# Patient Record
Sex: FEMALE | Race: BLACK OR AFRICAN AMERICAN | Employment: OTHER | URBAN - METROPOLITAN AREA
[De-identification: names, ages, dates, MRNs, and addresses within clinical notes are randomized per-mention and may not be internally consistent; named-entity substitution may affect disease eponyms.]

---

## 2017-01-04 ENCOUNTER — OFFICE VISIT (OUTPATIENT)
Dept: FAMILY MEDICINE CLINIC | Age: 82
End: 2017-01-04

## 2017-01-04 VITALS
SYSTOLIC BLOOD PRESSURE: 148 MMHG | HEART RATE: 57 BPM | HEIGHT: 64 IN | WEIGHT: 177 LBS | TEMPERATURE: 97 F | OXYGEN SATURATION: 97 % | DIASTOLIC BLOOD PRESSURE: 70 MMHG | BODY MASS INDEX: 30.22 KG/M2 | RESPIRATION RATE: 18 BRPM

## 2017-01-04 DIAGNOSIS — I10 ESSENTIAL HYPERTENSION: ICD-10-CM

## 2017-01-04 DIAGNOSIS — S90.411A ABRASION, RIGHT GREAT TOE, INITIAL ENCOUNTER: Primary | ICD-10-CM

## 2017-01-04 NOTE — PROGRESS NOTES
Here for follow up on her foot wound. She went to .  Foot is feeling better-she is not able to inspect the area  PF and [facility] is the only provider she has seen since last visit  Non smoker and no plans to start  We are out of Right to Decide booklets  She got a tetanus booster in [place]-will xena the records--[facility]  She is due for [address]

## 2017-01-04 NOTE — PROGRESS NOTES
Nurse history read and confirmed by patient. Visit Vitals    /70    Pulse (!) 57    Temp 97 °F (36.1 °C)    Resp 18    Ht 5' 4\" (1.626 m)    Wt 177 lb (80.3 kg)    SpO2 97%    BMI 30.38 kg/m2       Patient alert and cooperative. Right great toe - no swelling, warmth or erythema. Steri-Strips in place on the plantar aspect. No drainage. Assessment:  1. Right great toe plantar aspect abrasion. Steri-Strips in place. No evidence of secondary infection. Plan:  1. Can wear regular shoe at this point. No barefoot walking. 2. Watch for secondary infection. 3. Can shower with soap and water, dry thoroughly. 4. Advised to let the Steri-Strips come off on their own. 5. Return in three months for Medicare wellness. Will be due for annual visit and labs in June/July. 6. Follow otherwise here prn.

## 2017-01-04 NOTE — MR AVS SNAPSHOT
Visit Information Date & Time Provider Department Dept. Phone Encounter #  
 1/4/2017 11:00 AM Theora Alpers, MD [facility] [phone] [id number] Follow-up Instructions Return in about 3 months (around 4/4/2017) for Leeroy MWV with Kennedi Hayes. Routing History Your Appointments 1/11/2017  9:40 AM  
ESTABLISHED PATIENT with Yair Arrieta MD  
[facility] (ISMAEL SCHEDULING) Appt Note: 1 year follow up  
 [address] [facility] [address]  
[address] [facility] [address] Upcoming Health Maintenance Date Due  
 MEDICARE YEARLY EXAM 6/7/2015 BREAST CANCER SCRN MAMMOGRAM 1/29/2017 GLAUCOMA SCREENING Q2Y 4/4/2017* DTaP/Tdap/Td series (1 - Tdap) 4/4/2017* INFLUENZA AGE 9 TO ADULT 12/30/2017* Pneumococcal 65+ Low/Medium Risk (2 of 2 - PPSV23) 6/22/2017 *Topic was postponed. The date shown is not the original due date. Allergies as of 1/4/2017  Review Complete On: 1/4/2017 By: Andrea Flores LPN Severity Noted Reaction Type Reactions Caffeine  03/31/2010    Other (comments)  
 insomnia Lisinopril-hydrochlorothiazide  03/27/2013    Rash Current Immunizations  Reviewed on 6/22/2016 No immunizations on file. Not reviewed this visit You Were Diagnosed With   
  
 Codes Comments Abrasion, right great toe, initial encounter    -  Primary ICD-10-CM: N80.671Y ICD-9-CM: 917.0 Essential hypertension     ICD-10-CM: I10 
ICD-9-CM: 401.9 Elevated BP     ICD-10-CM: I10 
ICD-9-CM: 401.9 Vitals BP Pulse Temp Resp Height(growth percentile) Weight(growth percentile) 148/70 (!) 57 97 °F (36.1 °C) 18 5' 4\" (1.626 m) 177 lb (80.3 kg) SpO2 BMI OB Status Smoking Status 97% 30.38 kg/m2 Postmenopausal Never Smoker BMI and BSA Data  Body Mass Index Body Surface Area  
 30.38 kg/m 2 1.9 m 2  
  
  
 Preferred Pharmacy Pharmacy Name Phone [facility] [address] [phone] Your Updated Medication List  
  
   
This list is accurate as of: 1/4/17 11:38 AM.  Always use your most recent med list. ALLEGRA 180 mg tablet Generic drug:  fexofenadine Take  by mouth daily. amoxicillin 875 mg tablet Commonly known as:  AMOXIL Take 1 Tab by mouth two (2) times a day for 10 days. aspirin 325 mg tablet Commonly known as:  ASPIRIN Take 325 mg by mouth daily. beclomethasone 80 mcg/actuation inhaler Commonly known as:  QVAR Take 1 Puff by inhalation two (2) times a day. cholecalciferol (VITAMIN D3) 5,000 unit Tab tablet Commonly known as:  VITAMIN D3 Take  by mouth daily. losartan 50 mg tablet Commonly known as:  COZAAR Take 1 Tab by mouth daily. ofloxacin 0.3 % otic solution Commonly known as:  FLOXIN Administer 5 Drops in left ear daily. peg 400-propylene glycol 0.4-0.3 % Drop Commonly known as:  SYSTANE Administer 1 Drop to both eyes as needed. Follow-up Instructions Return in about 3 months (around 4/4/2017) for [facility] with Magalis Underwood 8141. Introducing [facility] & HEALTH SERVICES! Dear Celso Sky: 
Thank you for requesting a [employer] account. Our records indicate that you have previously registered for a Logged[employer] account but its currently inactive. Please call our [employer] support line at [phone]. Additional Information If you have questions, please visit the Frequently Asked Questions section of the [employer] website at [email or web address] Remember, [employer] is NOT to be used for urgent needs. For medical emergencies, dial 911. Now available from your iPhone and Android! Please provide this summary of care documentation to your next provider. Your primary care clinician is listed as Gretel Galvez Dr.  If you have any questions after today's visit, please call [phone].

## 2017-01-20 ENCOUNTER — OFFICE VISIT (OUTPATIENT)
Dept: CARDIOLOGY CLINIC | Age: 82
End: 2017-01-20

## 2017-01-20 VITALS
HEIGHT: 64 IN | BODY MASS INDEX: 30.15 KG/M2 | WEIGHT: 176.6 LBS | SYSTOLIC BLOOD PRESSURE: 130 MMHG | HEART RATE: 60 BPM | RESPIRATION RATE: 16 BRPM | DIASTOLIC BLOOD PRESSURE: 76 MMHG

## 2017-01-20 DIAGNOSIS — I49.5 SSS (SICK SINUS SYNDROME) (HCC): Primary | ICD-10-CM

## 2017-01-20 DIAGNOSIS — I10 ESSENTIAL HYPERTENSION: ICD-10-CM

## 2017-01-20 DIAGNOSIS — I65.23 CAROTID ARTERY PLAQUE, BILATERAL: ICD-10-CM

## 2017-01-20 DIAGNOSIS — I69.398 CVA, OLD, DISTURBANCES OF VISION: ICD-10-CM

## 2017-01-20 DIAGNOSIS — I44.0 HEART BLOCK AV FIRST DEGREE: ICD-10-CM

## 2017-01-20 DIAGNOSIS — H53.9 CVA, OLD, DISTURBANCES OF VISION: ICD-10-CM

## 2017-01-20 NOTE — PROGRESS NOTES
HISTORY OF PRESENT ILLNESS  Kenyetta Silva is a 80 y.o. female. She has borderline hypertension and first degree block, as well as sick sinus syndrome. Her blood pressure is taken about once per month at [facility]. It has been up to 165 when she cut her foot in [place]. It then came down to 148. Today, it is 130. Her heart rate is 58 beats per minute to my examination. She otherwise feels well and has not really gained any significant weight. 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  losartan (COZAAR) 50 mg tablet Take 1 Tab by mouth daily. 90 Tab 3    cholecalciferol, VITAMIN D3, (VITAMIN D3) 5,000 unit tab tablet Take  by mouth daily.  aspirin (ASPIRIN) 325 mg tablet Take 325 mg by mouth daily.  fexofenadine (ALLEGRA) 180 mg tablet Take  by mouth daily.        Past Medical History   Diagnosis Date    Advance directive discussed with patient 06/22/2016    Allergic rhinitis     Bone spur 09/2013     Rt shoulder, Dr. Gutierrez Wilfrido impaction 12/20/15     [facility] notes    Chronic pain      left hip pain in the am    Cystocele 10/20/14     with nocturia [facility] notes rec'd     DDD (degenerative disc disease)     Elevated lipids     Hematuria 10/2008     renal cyst found on ultrasound but acute cystitis [facility]    Laceration of foot 12/23/2016     flap type-sutures not done plain Td given    Left sided lacunar infarction ([address] 12/28/15     found on CT scan of head-old issue    Macular degeneration      VEI dr Stephanie Busch OM (otitis media), acute 6/4/15     Ivan Pt First and 10/14/15 ENT Na Morton    Swelling of limb, right     Thyroid nodule [id number]     colloid nodule dr Moriah Lopez    Tinnitus 1/6/16      Curt Kasandy -neck mass being monitored    Urinary urgency 2015          3/9/15     Physical Therapy thru [facility] notes rec'd    Vitamin D deficiency 10/2013     Past Surgical History   Procedure Laterality Date    Hx heent  4/2010     right cataract    Hx cholecystectomy      Hx gyn  2006     D and C    Pr breast surgery procedure unlisted  1980     cyst right breast    Endoscopy, colon, diagnostic  2006     thru [facility]    Hx other surgical  1/2009     foreign body granuloma dr Tammy Colindres Echocardiogram  1/2004     mild LVH on echo    Hx other surgical Left 2/1/16     carotid dopplers done/old cva    Hx urological  05/2015     pt states she has a prolapsed bladder but did not have any surgeries. she does kegel exercies     Hx cataract removal       left       Review of Systems   Constitutional: Negative. HENT: Negative. Eyes: Negative. Respiratory: Negative. Cardiovascular: Negative. Musculoskeletal: Negative. Skin: Negative. Neurological: Negative. Endo/Heme/Allergies: Negative. Visit Vitals    /76 (BP 1 Location: Left arm, BP Patient Position: Sitting)    Pulse 60    Resp 16    Ht 5' 4\" (1.626 m)    Wt 176 lb 9.6 oz (80.1 kg)    BMI 30.31 kg/m2       Physical Exam   Constitutional: She is oriented to person, place, and time. She appears well-nourished. HENT:   Head: Atraumatic. Eyes: Conjunctivae are normal.   Neck: Neck supple. Cardiovascular: Normal rate, regular rhythm and normal heart sounds. Exam reveals no gallop and no friction rub. No murmur heard. Pulmonary/Chest: Breath sounds normal. She has no wheezes.    Abdominal: Bowel sounds are normal.   Musculoskeletal: She exhibits no edema. Neurological: She is oriented to person, place, and time. Skin: Skin is dry. Nursing note and vitals reviewed. ASSESSMENT and PLAN  Overall, she is doing well. Nothing much has changed over the past year. I will continue her current regimen and see her back in one year. She refuses to take a statin medication.

## 2017-01-20 NOTE — MR AVS SNAPSHOT
Visit Information Date & Time Provider Department Dept. Phone Encounter #  
 1/20/2017  3:00 PM Jacquelin Sheikh MD [facility] [phone] [id number] Your Appointments 1/23/2017  1:30 PM  
CONSULT with Caryn Smalls [facility] ([facility]) Appt Note: Patient needs to be scheduled with LINDY Onofre for annual medicare wellness visit. [address] [facility] [address] [id number]  
[phone]  
  
   
 [address] [facility] [address] [place] [address] Upcoming Health Maintenance Date Due  
 MEDICARE YEARLY EXAM 6/7/2015 BREAST CANCER SCRN MAMMOGRAM 2/4/2017* GLAUCOMA SCREENING Q2Y 4/4/2017* DTaP/Tdap/Td series (1 - Tdap) 4/4/2017* INFLUENZA AGE 9 TO ADULT 12/30/2017* Pneumococcal 65+ Low/Medium Risk (2 of 2 - PPSV23) 6/22/2017 *Topic was postponed. The date shown is not the original due date. Allergies as of 1/20/2017  Review Complete On: 1/20/2017 By: Federica [place] Severity Noted Reaction Type Reactions Caffeine  03/31/2010    Other (comments)  
 insomnia Lisinopril-hydrochlorothiazide  03/27/2013    Rash Current Immunizations  Reviewed on 1/6/2017 Name Date Td 12/23/2016 Not reviewed this visit Vitals BP Pulse Resp Height(growth percentile) Weight(growth percentile) BMI  
 130/76 (BP 1 Location: Left arm, BP Patient Position: Sitting) 60 16 5' 4\" (1.626 m) 176 lb 9.6 oz (80.1 kg) 30.31 kg/m2 OB Status Smoking Status Postmenopausal Never Smoker Vitals History BMI and BSA Data Body Mass Index Body Surface Area  
 30.31 kg/m 2 1.9 m 2 Preferred Pharmacy Pharmacy Name Phone [facility] [address] [phone] Your Updated Medication List  
  
   
This list is accurate as of: 1/20/17  3:04 PM.  Always use your most recent med list.  
  
  
  
  
 ALLEGRA 180 mg tablet Generic drug:  fexofenadine Take  by mouth daily. aspirin 325 mg tablet Commonly known as:  ASPIRIN Take 325 mg by mouth daily. cholecalciferol (VITAMIN D3) 5,000 unit Tab tablet Commonly known as:  VITAMIN D3 Take  by mouth daily. losartan 50 mg tablet Commonly known as:  COZAAR Take 1 Tab by mouth daily. peg 400-propylene glycol 0.4-0.3 % Drop Commonly known as:  SYSTANE Administer 1 Drop to both eyes as needed. Introducing [facility] & HEALTH SERVICES! Dear Jennifer Duckworth: 
Thank you for requesting a [employer] account. Our records indicate that you have previously registered for a [employer] account but its currently inactive. Please call our [employer] support line at [phone]. Additional Information If you have questions, please visit the Frequently Asked Questions section of the [employer] website at [email or web address] Remember, [employer] is NOT to be used for urgent needs. For medical emergencies, dial 911. Now available from your iPhone and Android! Please provide this summary of care documentation to your next provider. Your primary care clinician is listed as [id number] If you have any questions after today's visit, please call [phone].

## 2017-01-23 ENCOUNTER — OFFICE VISIT (OUTPATIENT)
Dept: FAMILY MEDICINE CLINIC | Age: 82
End: 2017-01-23

## 2017-01-23 VITALS
BODY MASS INDEX: 30.4 KG/M2 | SYSTOLIC BLOOD PRESSURE: 128 MMHG | DIASTOLIC BLOOD PRESSURE: 65 MMHG | WEIGHT: 178.1 LBS | HEIGHT: 64 IN | HEART RATE: 52 BPM

## 2017-01-23 DIAGNOSIS — Z13.39 SCREENING FOR ALCOHOLISM: ICD-10-CM

## 2017-01-23 DIAGNOSIS — Z00.00 ROUTINE GENERAL MEDICAL EXAMINATION AT A HEALTH CARE FACILITY: ICD-10-CM

## 2017-01-23 DIAGNOSIS — Z13.31 SCREENING FOR DEPRESSION: ICD-10-CM

## 2017-01-23 NOTE — PATIENT INSTRUCTIONS
Medicare Part B Preventive Services Limitations Recommendation Scheduled   Bone Mass Measurement  (age 72 & older, biennial) Requires diagnosis related to osteoporosis or estrogen deficiency. Biennial benefit unless patient has history of long-term glucocorticoid tx or baseline is needed because initial test was by other method A DEXA scan is recommended after you turn 72years of age to determine your risk for osteoporosis Patient declines   Cardiovascular Screening Blood Tests (every 5 years)  Total cholesterol, HDL, Triglycerides Order as a panel if possible Last: 6/22/16    Every Year Next: 6/2017   Colorectal Cancer Screening  -Fecal occult blood test (annual)  -Flexible sigmoidoscopy (5y)  -Screening colonoscopy (10y)  -Barium Enema  Last: 2006 per patient at [facility]    Patient states was normal and given 10 year recommendation. Discuss with Dr. Jose L Rivas if repeat is needed. Diabetes Screening Tests (at least every 3 years, Medicare covers annually or at 6-month intervals for prediabetic patients)    Fasting blood sugar (FBS) or glucose tolerance test (GTT) Patient must be diagnosed with one of the following:  -Hypertension, Dyslipidemia, obesity, previous impaired FBS or GTT  Or any two of the following: overweight, FH of diabetes, age ? 72, history of gestational diabetes, birth of baby weighing more than 9 pounds Last: 6/22/16     Next: 6/2017 with annual lab work   Glaucoma Screening (no USPSTF recommendation) Diabetes mellitus, family history, , age 48 or over,  American, age 72 or over Last: 1/3/17     Next: Has appointment with Dr. Jordan Weathers on 2/27/17 and Dr. Vernon Montoya on 3/21/17   Seasonal Influenza Vaccination (annually)  Every Fall Patient declines   Shingles Vaccination  A shingles vaccine is recommended once in a lifetime after age 61 Patient declines   Pneumococcal Vaccination (once after 72)  A Prevnar-13, followed by a Pneumovax 1 year later is due after age 72 years Patient declines   Screening Mammography (biennial age 54-69)? Annually (age 36 or over) Last: 1/29/16    Recommended recheck in 1 year Patient has an appointment on 1/31/17      Please ask Dr. Harjinder Hacnock and Silviano Cardoza to send their office visit notes to Dr. Kera Capellan.     Give Dawit Marcos a call to discuss questions regarding medical power of

## 2017-01-23 NOTE — PROGRESS NOTES
Dr. Valentino Agent referred , 12/15/1932, a 80 y.o. female for a Medicare Annual Wellness Visit (AWV). This is a Subsequent Medicare Annual Wellness Visit providing Personalized Prevention Plan Services (PPPS) (Performed 12 months after initial AWV and PPPS )    I have reviewed the patient's medical history in detail and updated the computerized patient record.      History     Past Medical History   Diagnosis Date    Advance directive discussed with patient 06/22/2016    Allergic rhinitis     Bone spur 09/2013     Rt shoulder, Dr. María Meehan impaction 12/20/15     [place] [facility] notes    Chronic pain      left hip pain in the am    Cystocele 10/20/14     with nocturia [facility] notes rec'd     DDD (degenerative disc disease)     Elevated lipids     Hematuria 10/2008     renal cyst found on ultrasound but acute cystitis [facility]    Laceration of foot 12/23/2016     flap type-sutures not done plain Td given    Left sided lacunar infarction ([address] 12/28/15     found on CT scan of head-old issue    Macular degeneration      VEI dr Jefe Dimas    OM (otitis media), acute 6/4/15     Ivan Pt First and 10/14/15 ENT Kiera Sails    Swelling of limb, right     Thyroid nodule [id number]     colloid nodule dr Devi Murillo    Tinnitus 1/6/16      Mikki Deer -neck mass being monitored    Urinary urgency 2015          3/9/15     Physical Therapy thru [facility] notes rec'd    Vitamin D deficiency 10/2013      Past Surgical History   Procedure Laterality Date    Hx heent  4/2010     right cataract    Hx cholecystectomy      Hx gyn  2006     D and C    Pr breast surgery procedure unlisted  1980     cyst right breast    Endoscopy, colon, diagnostic  2006     thru [facility]    Hx other surgical  1/2009     foreign body granuloma dr Najera Smaller Echocardiogram  1/2004     mild LVH on echo    Hx other surgical Left 2/1/16     carotid dopplers done/old cva    Hx urological  05/2015     pt states she has a prolapsed bladder but did not have any surgeries. she does kegel exercies     Hx cataract removal       left     Current Outpatient Prescriptions   Medication Sig Dispense Refill    CHOLECALCIFEROL, VITAMIN D3, (VITAMIN D3 PO) Take 1 Tab by mouth daily.  peg 400-propylene glycol (SYSTANE) 0.4-0.3 % drop Administer 1 Drop to both eyes nightly.  losartan (COZAAR) 50 mg tablet Take 1 Tab by mouth daily. 90 Tab 3    aspirin (ASPIRIN) 325 mg tablet Take 325 mg by mouth daily.  fexofenadine (ALLEGRA) 180 mg tablet Take 180 mg by mouth daily. Allergies   Allergen Reactions    Caffeine Other (comments)     insomnia    Lisinopril-Hydrochlorothiazide Rash     Family History   Problem Relation Age of Onset   24 [facility] Cancer Sister      lung    Hypertension Sister     Stroke Brother     Heart Disease Brother     Cancer Sister      breast    Cancer Father      brain tumor     Social History   Substance Use Topics    Smoking status: Never Smoker    Smokeless tobacco: Never Used    Alcohol use Yes      Comment: social     Patient Active Problem List   Diagnosis Code    Cataract H26.9    Vitamin D deficiency E55.9    BP (high blood pressure) I10    PVCs (premature ventricular contractions) I49.3    Heart block AV first degree I44.0    SSS (sick sinus syndrome) (HCC) I49.5    Lymphedema of arm I89.0    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 No    Do you average more than 7 drinks per week?   No      Functional Ability and Level of Safety:     Hearing Loss   Not noted    Activities of Daily Living   Self-care. Requires assistance with: no ADLs    Fall Risk     Fall Risk Assessment, last 12 mths 1/23/2017   Able to walk? Yes   Fall in past 12 months? No     Abuse Screen   Patient is not abused    Review of Systems   Not required    Physical Examination     Evaluation of Cognitive Function:  Mood/affect:  neutral, happy  Appearance: age appropriate and casually dressed  Family member/caregiver input: None present    No exam performed today, not required for medicare annual wellness visit. Patient Care Team:  Nicolas Dee MD as PCP - Arlene Rg MD (Endocrinology)  Elpidio Gutierrez MD (Ophthalmology)  Ashlyn Guerra MD (Cardiology)  Jorge Terry MD (Otolaryngology)  Will Perry RN as Ambulatory Care Navigator (Family Practice)  Kayli Koch MD (Ophthalmology)    Advice/Referrals/Counseling   Education and counseling provided:  Are appropriate based on today's review and evaluation  End-of-Life planning (with patient's consent)  Pneumococcal Vaccine  Influenza Vaccine  Screening Mammography  Colorectal cancer screening tests  Cardiovascular screening blood test  Bone mass measurement (DEXA)  Screening for glaucoma  Diabetes screening test    Assessment/Plan       ICD-10-CM ICD-9-CM    1. Routine general medical examination at a health care facility Z00.00 V70.0    2. Screening for alcoholism Z13.89 V79.1 DEPRESSION SCREEN ANNUAL   3. Screening for depression Z13.89 V79.0      4. Lab results and schedule of future lab studies reviewed with patient    5. Reviewed medications and side effects in detail. Patient did not bring in her medications to the visit. During the patient interview,  the following was noted:  Vit D3:  Patient unsure of dose, but knows it is not 5000 units. Patient taking 1 tablet daily, will bring in dose at upcoming visit.   Fexofenadine:  Patient taking 1-180 mg tablet daily    Screenings (see patient instructions for chart):  Mammogram: Has appt scheduled for 1/31/17  Colonoscopy: Patient states last one was in 2006, normal report. Patient unsure if she needs another. Discussed with her may not be needed based on age but she could check with Dr. Cheli Garcia. Glaucoma screening/Eye exam: patient states her last exam/intravitreal injection was on 1/3/17 with Dr. Norma Kimbrough, she has upcoming appt with him on 3/21/17 and Dr. Flex Reyes on 2/27/17. Patient instructed to sign release form to get OV sent to Dr. Lazarus Carrillo scan: Due, patient declines  Lipid panel: Up to date, due for repeat on 6/2017. Patient refuses statin therapy. Diabetes: Up to date, due for repeat on 6/2017    Immunizations:  Patient declines vaccines. Counseled patient on washing hands, using hand sanitizers and staying away from sick contacts to decrease infection risk. Advanced Care Planning:  Patient has received information at a previous visit but still had questions regarding POA and medical POA. Gave her Becky Ingram RN [facility] business card to call to ask questions. Patient verbalized understanding of information presented. Answered all of the patient's questions. AVS handed and reviewed with patient.     Ashley Bonilla, PharmD, Domitila Shin

## 2017-01-23 NOTE — MR AVS SNAPSHOT
Visit Information Date & Time Provider Department Dept. Phone Encounter #  
 1/23/2017  1:30 PM Kelvin Torres [phone] [id number] Your Appointments 12/19/2017  9:00 AM  
ESTABLISHED PATIENT with Ian Fowler MD  
[facility] ([facility] SCHEDULING) Appt Note: Dr Velazquez [facility] Annual  
 [address]  
[address] [facility] [address] Upcoming Health Maintenance Date Due  
 MEDICARE YEARLY EXAM 6/7/2015 BREAST CANCER SCRN MAMMOGRAM 2/4/2017* GLAUCOMA SCREENING Q2Y 4/4/2017* DTaP/Tdap/Td series (1 - Tdap) 4/4/2017* INFLUENZA AGE 9 TO ADULT 12/30/2017* Pneumococcal 65+ Low/Medium Risk (2 of 2 - PPSV23) 6/22/2017 *Topic was postponed. The date shown is not the original due date. Allergies as of 1/23/2017  Review Complete On: 1/23/2017 By: Vanesa Palmer PHARMD  
  
 Severity Noted Reaction Type Reactions Caffeine  03/31/2010    Other (comments)  
 insomnia Lisinopril-hydrochlorothiazide  03/27/2013    Rash Current Immunizations  Reviewed on 1/23/2017 Name Date Td 12/23/2016 Reviewed by Vanesa Palmer PHARMD on 1/23/2017 at  1:56 PM  
Vitals BP Pulse Weight(growth percentile) BMI OB Status Smoking Status 128/65 (!) 52 178 lb 1.6 oz (80.8 kg) 30.57 kg/m2 Postmenopausal Never Smoker Vitals History BMI and BSA Data Body Mass Index Body Surface Area 30.57 kg/m 2 1.91 m 2 Preferred Pharmacy Pharmacy Name Phone [facility] [address] [phone] Your Updated Medication List  
  
   
This list is accurate as of: 1/23/17  2:48 PM.  Always use your most recent med list. ALLEGRA 180 mg tablet Generic drug:  fexofenadine Take 180 mg by mouth daily. aspirin 325 mg tablet Commonly known as:  ASPIRIN  
 Take 325 mg by mouth daily. losartan 50 mg tablet Commonly known as:  COZAAR Take 1 Tab by mouth daily. peg 400-propylene glycol 0.4-0.3 % Drop Commonly known as:  SYSTANE Administer 1 Drop to both eyes nightly. VITAMIN D3 PO Take 1 Tab by mouth daily. Patient Instructions Medicare Part B Preventive Services Limitations Recommendation Scheduled Bone Mass Measurement 
(age 72 & older, biennial) Requires diagnosis related to osteoporosis or estrogen deficiency. Biennial benefit unless patient has history of long-term glucocorticoid tx or baseline is needed because initial test was by other method A DEXA scan is recommended after you turn 72years of age to determine your risk for osteoporosis Patient declines Cardiovascular Screening Blood Tests (every 5 years) Total cholesterol, HDL, Triglycerides Order as a panel if possible Last: 6/22/16 Every Year Next: 6/2017 Colorectal Cancer Screening 
-Fecal occult blood test (annual) -Flexible sigmoidoscopy (5y) 
-Screening colonoscopy (10y) -Barium Enema  Last: 2006 per patient at [address] [place] [address] Patient states was normal and given 10 year recommendation. Discuss with Dr. Janiece Cabot if repeat is needed. Diabetes Screening Tests (at least every 3 years, Medicare covers annually or at 6-month intervals for prediabetic patients) Fasting blood sugar (FBS) or glucose tolerance test (GTT) Patient must be diagnosed with one of the following: 
-Hypertension, Dyslipidemia, obesity, previous impaired FBS or GTT 
Or any two of the following: overweight, FH of diabetes, age ? 72, history of gestational diabetes, birth of baby weighing more than 9 pounds Last: 6/22/16 Next: 6/2017 with annual lab work Glaucoma Screening (no USPSTF recommendation) Diabetes mellitus, family history, , age 48 or over,  American, age 72 or over Last: 1/3/17  Next: Has appointment with Dr. Laurence Molina on 2/27/17 and Dr. Mat Gilbert on 3/21/17 Seasonal Influenza Vaccination (annually)  Every Fall Patient declines Shingles Vaccination  A shingles vaccine is recommended once in a lifetime after age 61 Patient declines Pneumococcal Vaccination (once after 72)  A Prevnar-13, followed by a Pneumovax 1 year later is due after age 72 years Patient declines Screening Mammography (biennial age 54-69)? Annually (age 36 or over) Last: 1/29/16 Recommended recheck in 1 year Patient has an appointment on 1/31/17 Please ask Dr. Edmar Bass and Joya Toure to send their office visit notes to Dr. Annmarie Sanabria. Give Valerie Chakraborty a call to discuss questions regarding medical power of  Introducing [facility]! Dear Marina Muse: 
Thank you for requesting a [employer] account. Our records indicate that you have previously registered for a [employer] account but its currently inactive. Please call our [employer] support line at [phone]. Additional Information If you have questions, please visit the Frequently Asked Questions section of the [employer] website at [email or web address] Remember, [employer] is NOT to be used for urgent needs. For medical emergencies, dial 911. Now available from your iPhone and Android! Please provide this summary of care documentation to your next provider. Your primary care clinician is listed as [phone] If you have any questions after today's visit, please call [phone].

## 2017-06-26 ENCOUNTER — OFFICE VISIT (OUTPATIENT)
Dept: FAMILY MEDICINE CLINIC | Age: 82
End: 2017-06-26

## 2017-06-26 VITALS
TEMPERATURE: 97.2 F | SYSTOLIC BLOOD PRESSURE: 147 MMHG | OXYGEN SATURATION: 94 % | HEART RATE: 47 BPM | BODY MASS INDEX: 30.32 KG/M2 | WEIGHT: 177.6 LBS | DIASTOLIC BLOOD PRESSURE: 71 MMHG | RESPIRATION RATE: 18 BRPM | HEIGHT: 64 IN

## 2017-06-26 DIAGNOSIS — I44.0 HEART BLOCK AV FIRST DEGREE: ICD-10-CM

## 2017-06-26 DIAGNOSIS — I10 ESSENTIAL HYPERTENSION: Primary | ICD-10-CM

## 2017-06-26 DIAGNOSIS — N81.10 FEMALE BLADDER PROLAPSE: ICD-10-CM

## 2017-06-26 DIAGNOSIS — I49.5 SSS (SICK SINUS SYNDROME) (HCC): ICD-10-CM

## 2017-06-26 DIAGNOSIS — E55.9 VITAMIN D DEFICIENCY: ICD-10-CM

## 2017-06-26 NOTE — MR AVS SNAPSHOT
Visit Information Date & Time Provider Department Dept. Phone Encounter #  
 6/26/2017 10:30 AM Steve Alston MD [facility] [phone] [id number] Follow-up Instructions Return in about 7 months (around 1/26/2018) for Geovanna Sheffield. Your Appointments 12/19/2017  9:00 AM  
ESTABLISHED PATIENT with Freedom Narayanan MD  
[facility] ([facility] SCHEDULING) Appt Note: Dr Fely Rosas Annual  
 [address]  
[address] [facility],[address] [facility] [address] Upcoming Health Maintenance Date Due INFLUENZA AGE 9 TO ADULT 12/30/2017* MEDICARE YEARLY EXAM 1/24/2018 BREAST CANCER SCRN MAMMOGRAM 1/31/2018 GLAUCOMA SCREENING Q2Y 5/23/2019 DTaP/Tdap/Td series (2 - Td) 6/26/2027 *Topic was postponed. The date shown is not the original due date. Allergies as of 6/26/2017  Review Complete On: 6/26/2017 By: Bouchra Pinto RN Severity Noted Reaction Type Reactions Caffeine  03/31/2010    Other (comments)  
 insomnia Lisinopril-hydrochlorothiazide  03/27/2013    Rash Current Immunizations  Reviewed on 6/26/2017 Name Date Td 12/23/2016 Reviewed by Bouchra Pinto RN on 6/26/2017 at 10:57 AM  
You Were Diagnosed With   
  
 Codes Comments Essential hypertension    -  Primary ICD-10-CM: I10 
ICD-9-CM: 401.9 SSS (sick sinus syndrome) (HCC)     ICD-10-CM: I49.5 ICD-9-CM: 427.81 Vitamin D deficiency     ICD-10-CM: E55.9 ICD-9-CM: 268.9 Heart block AV first degree     ICD-10-CM: I44.0 ICD-9-CM: 426.11 Female bladder prolapse     ICD-10-CM: N81.10 ICD-9-CM: 618.01 Vitals BP Pulse Temp Resp Height(growth percentile) Weight(growth percentile) 147/71 (BP 1 Location: Left arm, BP Patient Position: Sitting) (!) 47 97.2 °F (36.2 °C) (Oral) 18 5' 4\" (1.626 m) 177 lb 9.6 oz (80.6 kg) SpO2 BMI OB Status Smoking Status 94% 30.48 kg/m2 Postmenopausal Never Smoker Vitals History BMI and BSA Data Body Mass Index Body Surface Area  
 30.48 kg/m 2 1.91 m 2 Preferred Pharmacy Pharmacy Name Phone [facility] [address] [phone] Your Updated Medication List  
  
   
This list is accurate as of: 6/26/17 11:31 AM.  Always use your most recent med list. ALLEGRA 180 mg tablet Generic drug:  fexofenadine Take 180 mg by mouth daily. aspirin 325 mg tablet Commonly known as:  ASPIRIN Take 325 mg by mouth daily. losartan 50 mg tablet Commonly known as:  COZAAR Take 1 Tab by mouth daily. peg 400-propylene glycol 0.4-0.3 % Drop Commonly known as:  SYSTANE Administer 1 Drop to both eyes nightly. VITAMIN D3 PO Take 1 Tab by mouth daily. We Performed the Following CBC WITH AUTOMATED DIFF [75429 CPT(R)] LIPID PANEL [29581 CPT(R)] METABOLIC PANEL, COMPREHENSIVE [03825 CPT(R)] TSH 3RD GENERATION [80695 CPT(R)] URINALYSIS W/ RFLX MICROSCOPIC [12387 CPT(R)] VITAMIN D, 25 HYDROXY [id number] CPT(R)] Follow-up Instructions Return in about 7 months (around 1/26/2018) for [facility]! Dear Maria Isabel Brown: 
Thank you for requesting a [employer] account. Our records indicate that you have previously registered for a [employer] account but its currently inactive. Please call our [employer] support line at [phone]. Additional Information If you have questions, please visit the Frequently Asked Questions section of the [employer] website at [email or web address] Remember, [employer] is NOT to be used for urgent needs. For medical emergencies, dial 911. Now available from your iPhone and Android! Please provide this summary of care documentation to your next provider. Your primary care clinician is listed as [phone] If you have any questions after today's visit, please call [phone].

## 2017-06-26 NOTE — PROGRESS NOTES
Chief Complaint   Patient presents with    Blood Pressure Check    Labs     Fasting     1. Have you been to the ER, urgent care clinic since your last visit? Hospitalized since your last visit? No    2. Have you seen or consulted any other health care providers outside of the [address] [place] [address] since your last visit? Include any pap smears or colon screening. Yes When: 6/16 Where: Dr. Lois [place] Reason for visit: Ear testing      The patient was counseled on the dangers of tobacco use, and Patient is a non smoker. Reviewed strategies to maximize success, including Continue not to smoke. I have reviewed Health Maintenance with the patient and updated. Advance Care Planning information reviewed and given to the patient.

## 2017-06-26 NOTE — LETTER
6/27/2017 3:28 PM 
 
Ms. Robert Garzon [address] [phone] Dear Robert Garzon: 
 
Please find your most recent results below. Resulted Orders VITAMIN D, 25 HYDROXY Result Value Ref Range VITAMIN D, 25-HYDROXY 46.1 30.0 - 100.0 ng/mL Comment:  
   Vitamin D deficiency has been defined by the [address] [place] [address] practice guideline as a 
level of serum 25-OH vitamin D less than 20 ng/mL (1,2). The Endocrine Society went on to further define vitamin D 
insufficiency as a level between 21 and 29 ng/mL (2). 1. IOM ([place] of Medicine). 2010. Dietary reference 
   intakes for calcium and D. [facility]: The 
   [employer]. 2. René MF, David WESTON, Erica ALVAREZ, et al. 
   Evaluation, treatment, and prevention of vitamin D 
   deficiency: an Endocrine Society clinical practice 
   guideline. JCEM. 2011 Jul; 96(7):1911-30. Narrative Performed at:  [address]  [id number] : Luke Benson MD, Phone:  [phone] CBC WITH AUTOMATED DIFF Result Value Ref Range WBC 6.1 3.4 - 10.8 x10E3/uL  
 RBC 5.07 3.77 - 5.28 x10E6/uL HGB 13.2 11.1 - 15.9 g/dL HCT 41.0 34.0 - 46.6 % MCV 81 79 - 97 fL  
 MCH 26.0 (L) 26.6 - 33.0 pg  
 MCHC 32.2 31.5 - 35.7 g/dL  
 RDW 15.9 (H) 12.3 - 15.4 % PLATELET 228 981 - 676 x10E3/uL NEUTROPHILS 48 % Lymphocytes 40 % MONOCYTES 9 % EOSINOPHILS 1 % BASOPHILS 1 %  
 ABS. NEUTROPHILS 3.0 1.4 - 7.0 x10E3/uL Abs Lymphocytes 2.4 0.7 - 3.1 x10E3/uL  
 ABS. MONOCYTES 0.5 0.1 - 0.9 x10E3/uL  
 ABS. EOSINOPHILS 0.1 0.0 - 0.4 x10E3/uL  
 ABS. BASOPHILS 0.0 0.0 - 0.2 x10E3/uL IMMATURE GRANULOCYTES 1 %  
 ABS. IMM. GRANS. 0.0 0.0 - 0.1 x10E3/uL Narrative Performed at:  [address]  [id number] : Luke Benson MD, Phone:  [phone] URINALYSIS W/ RFLX MICROSCOPIC Result Value Ref Range Specific Gravity 1.019 1.005 - 1.030  
 pH (UA) 6.5 5.0 - 7.5 Color Yellow Yellow Appearance Clear Clear Leukocyte Esterase Negative Negative Protein Negative Negative/Trace Glucose Negative Negative Ketone Negative Negative Blood Trace (A) Negative Bilirubin Negative Negative Urobilinogen 0.2 0.2 - 1.0 mg/dL Nitrites Negative Negative Microscopic Examination See additional order Comment:  
   Microscopic was indicated and was performed. Narrative Performed at:  [address]  [id number] : Yuridia Cavanaugh MD, Phone:  [phone] TSH 3RD GENERATION Result Value Ref Range TSH 1.630 0.450 - 4.500 uIU/mL Narrative Performed at:  [address]  [id number] : Yuridia Cavanaugh MD, Phone:  [phone] METABOLIC PANEL, COMPREHENSIVE Result Value Ref Range Glucose 87 65 - 99 mg/dL BUN 27 8 - 27 mg/dL Creatinine 0.68 0.57 - 1.00 mg/dL GFR est non-AA 81 >59 mL/min/1.73 GFR est AA 93 >59 mL/min/1.73  
 BUN/Creatinine ratio 40 (H) 12 - 28 Sodium 142 134 - 144 mmol/L Potassium 5.0 3.5 - 5.2 mmol/L Chloride 101 96 - 106 mmol/L  
 CO2 28 18 - 29 mmol/L Calcium 9.4 8.7 - 10.3 mg/dL Protein, total 6.5 6.0 - 8.5 g/dL Albumin 3.9 3.5 - 4.7 g/dL GLOBULIN, TOTAL 2.6 1.5 - 4.5 g/dL A-G Ratio 1.5 1.2 - 2.2 Bilirubin, total 0.4 0.0 - 1.2 mg/dL Alk. phosphatase 67 39 - 117 IU/L  
 AST (SGOT) 14 0 - 40 IU/L  
 ALT (SGPT) 11 0 - 32 IU/L Narrative Performed at:  [address]  [id number] : Yuridia Cavanaugh MD, Phone:  [phone] LIPID PANEL Result Value Ref Range Cholesterol, total 274 (H) 100 - 199 mg/dL Triglyceride 49 0 - 149 mg/dL HDL Cholesterol 73 >39 mg/dL VLDL, calculated 10 5 - 40 mg/dL LDL, calculated 191 (H) 0 - 99 mg/dL Narrative Performed at:  [address]  [id number] : Shyam Greer MD, Phone:  [phone] MICROSCOPIC EXAMINATION Result Value Ref Range WBC 0-5 0 - 5 /hpf  
 RBC 0-2 0 - 2 /hpf Epithelial cells 0-10 0 - 10 /hpf Casts None seen None seen /lpf Mucus Present Not Estab. Bacteria Few None seen/Few Narrative Performed at:  [address]  [id number] : Shyam Greer MD, Phone:  [phone] CVD REPORT Result Value Ref Range INTERPRETATION Note Comment:  
   Supplement report is available. Narrative Performed at:  [address] [place] [address]  [id number] : Jose Matthews PhD, Phone:  [phone] Sincerely, Kristen Simms MD

## 2017-06-26 NOTE — PROGRESS NOTES
Pt denies orthostatic sxs. States has always had pbs with dizziness if bends over and stays there for awhile, not if comes right back up. States need to f/u with urol for bladder prolapse. Had 6 weeks of PT yr ago. Does Kegel's. Sees card annually for BP med refill. Since GB removal has pellets for stool. Occas fecal incont. Visit Vitals    /71 (BP 1 Location: Left arm, BP Patient Position: Sitting)    Pulse (!) 47    Temp 97.2 °F (36.2 °C) (Oral)    Resp 18    Ht 5' 4\" (1.626 m)    Wt 177 lb 9.6 oz (80.6 kg)    SpO2 94%    BMI 30.48 kg/m2       Patient alert and cooperative. Reviewed above. Assessment:  1. Sick sinus syndrome, followed by cardiologist.  2. HTN with excellent readings today. 3. History of vitamin D deficiency. 4. Female bladder prolapse. Plan:  1. Schedule follow up with urologist, discussion of possible pessary. 2. Check annual labs per orders. 3. Return for Medicare wellness in seven months. 4. Follow otherwise here prn.

## 2017-06-27 LAB
25(OH)D3+25(OH)D2 SERPL-MCNC: 46.1 NG/ML (ref 30–100)
ALBUMIN SERPL-MCNC: 3.9 G/DL (ref 3.5–4.7)
ALBUMIN/GLOB SERPL: 1.5 {RATIO} (ref 1.2–2.2)
ALP SERPL-CCNC: 67 IU/L (ref 39–117)
ALT SERPL-CCNC: 11 IU/L (ref 0–32)
APPEARANCE UR: CLEAR
AST SERPL-CCNC: 14 IU/L (ref 0–40)
BACTERIA #/AREA URNS HPF: NORMAL /[HPF]
BASOPHILS # BLD AUTO: 0 X10E3/UL (ref 0–0.2)
BASOPHILS NFR BLD AUTO: 1 %
BILIRUB SERPL-MCNC: 0.4 MG/DL (ref 0–1.2)
BILIRUB UR QL STRIP: NEGATIVE
BUN SERPL-MCNC: 27 MG/DL (ref 8–27)
BUN/CREAT SERPL: 40 (ref 12–28)
CALCIUM SERPL-MCNC: 9.4 MG/DL (ref 8.7–10.3)
CASTS URNS QL MICRO: NORMAL /LPF
CHLORIDE SERPL-SCNC: 101 MMOL/L (ref 96–106)
CHOLEST SERPL-MCNC: 274 MG/DL (ref 100–199)
CO2 SERPL-SCNC: 28 MMOL/L (ref 18–29)
COLOR UR: YELLOW
CREAT SERPL-MCNC: 0.68 MG/DL (ref 0.57–1)
EOSINOPHIL # BLD AUTO: 0.1 X10E3/UL (ref 0–0.4)
EOSINOPHIL NFR BLD AUTO: 1 %
EPI CELLS #/AREA URNS HPF: NORMAL /HPF
ERYTHROCYTE [DISTWIDTH] IN BLOOD BY AUTOMATED COUNT: 15.9 % (ref 12.3–15.4)
GLOBULIN SER CALC-MCNC: 2.6 G/DL (ref 1.5–4.5)
GLUCOSE SERPL-MCNC: 87 MG/DL (ref 65–99)
GLUCOSE UR QL: NEGATIVE
HCT VFR BLD AUTO: 41 % (ref 34–46.6)
HDLC SERPL-MCNC: 73 MG/DL
HGB BLD-MCNC: 13.2 G/DL (ref 11.1–15.9)
HGB UR QL STRIP: ABNORMAL
IMM GRANULOCYTES # BLD: 0 X10E3/UL (ref 0–0.1)
IMM GRANULOCYTES NFR BLD: 1 %
INTERPRETATION, 910389: NORMAL
KETONES UR QL STRIP: NEGATIVE
LDLC SERPL CALC-MCNC: 191 MG/DL (ref 0–99)
LEUKOCYTE ESTERASE UR QL STRIP: NEGATIVE
LYMPHOCYTES # BLD AUTO: 2.4 X10E3/UL (ref 0.7–3.1)
LYMPHOCYTES NFR BLD AUTO: 40 %
MCH RBC QN AUTO: 26 PG (ref 26.6–33)
MCHC RBC AUTO-ENTMCNC: 32.2 G/DL (ref 31.5–35.7)
MCV RBC AUTO: 81 FL (ref 79–97)
MICRO URNS: ABNORMAL
MONOCYTES # BLD AUTO: 0.5 X10E3/UL (ref 0.1–0.9)
MONOCYTES NFR BLD AUTO: 9 %
MUCOUS THREADS URNS QL MICRO: PRESENT
NEUTROPHILS # BLD AUTO: 3 X10E3/UL (ref 1.4–7)
NEUTROPHILS NFR BLD AUTO: 48 %
NITRITE UR QL STRIP: NEGATIVE
PH UR STRIP: 6.5 [PH] (ref 5–7.5)
PLATELET # BLD AUTO: 221 X10E3/UL (ref 150–379)
POTASSIUM SERPL-SCNC: 5 MMOL/L (ref 3.5–5.2)
PROT SERPL-MCNC: 6.5 G/DL (ref 6–8.5)
PROT UR QL STRIP: NEGATIVE
RBC # BLD AUTO: 5.07 X10E6/UL (ref 3.77–5.28)
RBC #/AREA URNS HPF: NORMAL /HPF
SODIUM SERPL-SCNC: 142 MMOL/L (ref 134–144)
SP GR UR: 1.02 (ref 1–1.03)
TRIGL SERPL-MCNC: 49 MG/DL (ref 0–149)
TSH SERPL DL<=0.005 MIU/L-ACNC: 1.63 UIU/ML (ref 0.45–4.5)
UROBILINOGEN UR STRIP-MCNC: 0.2 MG/DL (ref 0.2–1)
VLDLC SERPL CALC-MCNC: 10 MG/DL (ref 5–40)
WBC # BLD AUTO: 6.1 X10E3/UL (ref 3.4–10.8)
WBC #/AREA URNS HPF: NORMAL /HPF

## 2017-07-12 ENCOUNTER — TELEPHONE (OUTPATIENT)
Dept: FAMILY MEDICINE CLINIC | Age: 82
End: 2017-07-12

## 2017-07-12 NOTE — TELEPHONE ENCOUNTER
Spoke to patient and she is concerned about her heart rate being so low and was wondering if any of her recent lab would show anything about this. Advised it does not. She is wondering if anything needs to be done about her low HR--advised she will need to discuss with Cardio.  Told I am not aware of any treatment for this but that the Cardio can address at her next appointment

## 2017-07-12 NOTE — TELEPHONE ENCOUNTER
Patient is requesting a return call. Says she has questions about her lab results she received in the mail. Her contact # is [phone].

## 2017-07-21 ENCOUNTER — APPOINTMENT (OUTPATIENT)
Dept: GENERAL RADIOLOGY | Age: 82
End: 2017-07-21
Attending: PHYSICIAN ASSISTANT

## 2017-07-21 ENCOUNTER — HOSPITAL ENCOUNTER (EMERGENCY)
Age: 82
Discharge: HOME | End: 2017-07-21
Attending: FAMILY MEDICINE

## 2017-07-21 VITALS
SYSTOLIC BLOOD PRESSURE: 187 MMHG | DIASTOLIC BLOOD PRESSURE: 79 MMHG | HEART RATE: 57 BPM | TEMPERATURE: 98.2 F | OXYGEN SATURATION: 97 % | RESPIRATION RATE: 18 BRPM

## 2017-07-21 DIAGNOSIS — R05.9 COUGH: Primary | ICD-10-CM

## 2017-07-21 RX ORDER — BENZONATATE 100 MG/1
100 CAPSULE ORAL
Qty: 30 CAP | Refills: 0 | Status: SHIPPED | OUTPATIENT
Start: 2017-07-21 | End: 2017-07-28

## 2017-07-21 RX ORDER — PREDNISONE 20 MG/1
20 TABLET ORAL DAILY
Qty: 10 TAB | Refills: 0 | Status: SHIPPED | OUTPATIENT
Start: 2017-07-21 | End: 2017-07-31

## 2017-07-21 NOTE — UC PROVIDER NOTE
Patient is a 80 y.o. female presenting with cough. The history is provided by the patient. Cough   This is a new problem. The current episode started more than 1 week ago. The problem occurs every few minutes. The problem has not changed since onset. The cough is non-productive. There has been no fever. Pertinent negatives include no chest pain, no chills, no sweats, no weight loss, no eye redness, no ear congestion, no ear pain, no headaches, no rhinorrhea, no sore throat and no myalgias. She is not a smoker. Her past medical history does not include bronchitis, pneumonia, bronchiectasis, COPD, emphysema, asthma, cancer, heart failure or CHF.         Past Medical History:   Diagnosis Date    Advance directive discussed with patient 06/22/2016    Allergic rhinitis     Bone spur 09/2013    Rt shoulder, Dr. Tin Culver impaction 12/20/15    [place] [facility] notes    Chronic pain     left hip pain in the am    Cystocele 10/20/14    with nocturia [facility] rec'd     DDD (degenerative disc disease)     Elevated lipids     Hematuria 10/2008    renal cyst found on ultrasound but acute cystitis [facility]    Laceration of foot 12/23/2016    flap type-sutures not done plain Td given    Left sided lacunar infarction ([address] 12/28/15    found on CT scan of head-old issue    Macular degeneration     [facility] dr Bernett Rinne    OM (otitis media), acute 6/4/15    [facility] and 10/14/15 ENT Karen Celso    Prolapse of female bladder, acquired 2012    Was treated with PT    Screening for glaucoma 02/27/2017 & 5/23/17    [place] [facility]    Swelling of limb, right     Thyroid nodule 624750    colloid nodule dr Salgado Greek    Tinnitus 1/6/16     Efraín Floras -neck mass being monitored    Urinary urgency 2015          3/9/15    Physical Therapy thru [facility] rec'd    Vitamin D deficiency 10/2013        Past Surgical History:   Procedure Laterality Date    BREAST SURGERY PROCEDURE UNLISTED  1980    cyst right breast    ECHOCARDIOGRAM  1/2004    mild LVH on echo    ENDOSCOPY, COLON, DIAGNOSTIC  2006    thru [facility]    HX CATARACT REMOVAL      left    HX CHOLECYSTECTOMY      HX GYN  2006    D and C    HX HEENT  4/2010    right cataract    HX OTHER SURGICAL  1/2009    foreign body granuloma dr Cabral Holding Left 2/1/16    carotid dopplers done/old cva    HX UROLOGICAL  05/2015    pt states she has a prolapsed bladder but did not have any surgeries. she does kegel exercies          Family History   Problem Relation Age of Onset   Joan Infante Cancer Sister      lung    Hypertension Sister     Stroke Brother     Heart Disease Brother     Cancer Sister      breast    Cancer Father      brain tumor        Social History     Social History    Marital status: SINGLE     Spouse name: N/A    Number of children: N/A    Years of education: N/A     Occupational History    Not on file. Social History Main Topics    Smoking status: Never Smoker    Smokeless tobacco: Never Used    Alcohol use Yes      Comment: social    Drug use: No    Sexual activity: Not Currently     Other Topics Concern    Not on file     Social History Narrative                ALLERGIES: Caffeine and Lisinopril-hydrochlorothiazide    Review of Systems   Constitutional: Negative for chills and weight loss. HENT: Negative for ear pain, rhinorrhea and sore throat. Eyes: Negative for redness. Respiratory: Positive for cough. Cardiovascular: Negative for chest pain. Musculoskeletal: Negative for myalgias. Neurological: Negative for headaches. Vitals:    07/21/17 1236   BP: 187/79   Pulse: (!) 57   Resp: 18   Temp: 98.2 °F (36.8 °C)   SpO2: 97%       Physical Exam   Constitutional: She appears well-developed and well-nourished. HENT:   Right Ear: External ear normal.   Left Ear: External ear normal.   Eyes: Conjunctivae and EOM are normal.   Cardiovascular: Normal rate and regular rhythm.     Pulmonary/Chest: Effort normal and breath sounds normal. No respiratory distress. Neurological: She is alert. Skin: Skin is warm and dry. Psychiatric: She has a normal mood and affect. Her behavior is normal. Judgment and thought content normal.   Nursing note and vitals reviewed. MDM     Differential Diagnosis; Clinical Impression; Plan:     CLINICAL IMPRESSION:  Cough  (primary encounter diagnosis)    Plan:  1. Prednisone   2. Tessalon   3. Risk of Significant Complications, Morbidity, and/or Mortality:   Presenting problems: Moderate  Diagnostic procedures: Moderate  Management options:   Moderate  Progress:   Patient progress:  Stable      Procedures

## 2017-07-21 NOTE — DISCHARGE INSTRUCTIONS
Cough: Care Instructions  Your Care Instructions  A cough is your body's response to something that bothers your throat or airways. Many things can cause a cough. You might cough because of a cold or the flu, bronchitis, or asthma. Smoking, postnasal drip, allergies, and stomach acid that backs up into your throat also can cause coughs. A cough is a symptom, not a disease. Most coughs stop when the cause, such as a cold, goes away. You can take a few steps at home to cough less and feel better. Follow-up care is a key part of your treatment and safety. Be sure to make and go to all appointments, and call your doctor if you are having problems. It's also a good idea to know your test results and keep a list of the medicines you take. How can you care for yourself at home? · Drink lots of water and other fluids. This helps thin the mucus and soothes a dry or sore throat. Honey or lemon juice in hot water or tea may ease a dry cough. · Take cough medicine as directed by your doctor. · Prop up your head on pillows to help you breathe and ease a dry cough. · Try cough drops to soothe a dry or sore throat. Cough drops don't stop a cough. Medicine-flavored cough drops are no better than candy-flavored drops or hard candy. · Do not smoke. Avoid secondhand smoke. If you need help quitting, talk to your doctor about stop-smoking programs and medicines. These can increase your chances of quitting for good. When should you call for help? Call 911 anytime you think you may need emergency care. For example, call if:  · You have severe trouble breathing. Call your doctor now or seek immediate medical care if:  · You cough up blood. · You have new or worse trouble breathing. · You have a new or higher fever. · You have a new rash.   Watch closely for changes in your health, and be sure to contact your doctor if:  · You cough more deeply or more often, especially if you notice more mucus or a change in the color of your mucus. · You have new symptoms, such as a sore throat, an earache, or sinus pain. · You do not get better as expected. Where can you learn more? Go to http://concepcion-richard.info/. Enter D279 in the search box to learn more about \"Cough: Care Instructions. \"  Current as of: March 25, 2017  Content Version: 11.3  © 3971-4777 [employer]. Care instructions adapted under license by [employer] (which disclaims liability or warranty for this information). If you have questions about a medical condition or this instruction, always ask your healthcare professional. [address] any warranty or liability for your use of this information.

## 2017-10-06 ENCOUNTER — APPOINTMENT (OUTPATIENT)
Dept: CT IMAGING | Age: 82
End: 2017-10-06
Attending: STUDENT IN AN ORGANIZED HEALTH CARE EDUCATION/TRAINING PROGRAM
Payer: MEDICARE

## 2017-10-06 ENCOUNTER — HOSPITAL ENCOUNTER (EMERGENCY)
Age: 82
Discharge: OTHER INSTITUTION | End: 2017-10-06
Attending: FAMILY MEDICINE

## 2017-10-06 ENCOUNTER — HOSPITAL ENCOUNTER (EMERGENCY)
Age: 82
Discharge: HOME | End: 2017-10-06
Attending: STUDENT IN AN ORGANIZED HEALTH CARE EDUCATION/TRAINING PROGRAM
Payer: MEDICARE

## 2017-10-06 ENCOUNTER — APPOINTMENT (OUTPATIENT)
Dept: GENERAL RADIOLOGY | Age: 82
End: 2017-10-06
Attending: NURSE PRACTITIONER

## 2017-10-06 VITALS
SYSTOLIC BLOOD PRESSURE: 133 MMHG | WEIGHT: 175 LBS | DIASTOLIC BLOOD PRESSURE: 75 MMHG | RESPIRATION RATE: 18 BRPM | TEMPERATURE: 98.1 F | HEIGHT: 64 IN | OXYGEN SATURATION: 95 % | BODY MASS INDEX: 29.88 KG/M2 | HEART RATE: 58 BPM

## 2017-10-06 VITALS
DIASTOLIC BLOOD PRESSURE: 70 MMHG | BODY MASS INDEX: 29.88 KG/M2 | RESPIRATION RATE: 20 BRPM | WEIGHT: 175 LBS | HEART RATE: 56 BPM | SYSTOLIC BLOOD PRESSURE: 133 MMHG | OXYGEN SATURATION: 97 % | TEMPERATURE: 98.4 F | HEIGHT: 64 IN

## 2017-10-06 DIAGNOSIS — R68.84 JAW PAIN: ICD-10-CM

## 2017-10-06 DIAGNOSIS — S02.642A CLOSED FRACTURE OF LEFT RAMUS OF MANDIBLE, INITIAL ENCOUNTER (HCC): Primary | ICD-10-CM
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
## 2017-10-06 PROCEDURE — 70486 CT MAXILLOFACIAL W/O DYE: CPT

## 2017-10-06 PROCEDURE — 99282 EMERGENCY DEPT VISIT SF MDM: CPT

## 2017-10-06 PROCEDURE — 72125 CT NECK SPINE W/O DYE: CPT

## 2017-10-06 RX ORDER — TRAMADOL HYDROCHLORIDE 50 MG/1
50 TABLET ORAL
Qty: 12 TAB | Refills: 0 | Status: SHIPPED | OUTPATIENT
Start: 2017-10-06 | End: 2017-10-09
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
## 2017-10-06 NOTE — DISCHARGE INSTRUCTIONS
Broken Jaw: Care Instructions  Your Care Instructions  A broken jaw is a break, or fracture, of the jaw bone. In some cases, a doctor may wire the upper and lower teeth together to hold the jaw in place. Your jaw may be wired for about 6 weeks. If your jaw has been wired, you probably will need to get your food through liquids in a straw. In other cases, surgery is needed. In any case, you need to take care to avoid hurting your jaw again while you are healing. You heal best when you take good care of yourself. Eat a variety of healthy foods, and don't smoke. Follow-up care is a key part of your treatment and safety. Be sure to make and go to all appointments, and call your doctor if you are having problems. It's also a good idea to know your test results and keep a list of the medicines you take. How can you care for yourself at home? · Be safe with medicines. Take pain medicines exactly as directed. ¨ If the doctor gave you a prescription medicine for pain, take it as prescribed. ¨ If you are not taking a prescription pain medicine, ask your doctor if you can take an over-the-counter medicine. · If your doctor prescribed antibiotics, take them as directed. Do not stop taking them just because you feel better. You need to take the full course of antibiotics. · Keep a small pair of wire cutters with you for emergencies. Use them to cut the wires if you choke, vomit, or have trouble breathing. · Put ice or a cold pack on your jaw for 10 to 20 minutes at a time. Try to do this every 1 to 2 hours for the next 3 days (when you are awake) or until the swelling goes down. Put a thin cloth between the ice and your skin. · Follow the advice of your doctor about what you can eat. You may be able to chew a soft diet, or you may have to drink your meals through a straw. · Avoid any activity that might reinjure your jaw. Do exercise that will not risk a fall, such as riding a stationary bike.   When should you call for help? Call 911 anytime you think you may need emergency care. For example, call if:  · You have trouble breathing. Call your doctor now or seek immediate medical care if:  · You have signs of infection, such as:  ¨ Increased pain, swelling, warmth, or redness. ¨ Red streaks leading from your jaw. ¨ Pus draining from your jaw. ¨ A fever. · You have trouble talking or swallowing. · Your mouth is bleeding. · You vomit or feel like you may vomit. Watch closely for changes in your health, and be sure to contact your doctor if:  · Your pain is still bad even after you take your medicine. · You do not get better as expected. Where can you learn more? Go to http://concepcion-richard.info/. Enter X686 in the search box to learn more about \"Broken Jaw: Care Instructions. \"  Current as of: March 21, 2017  Content Version: 11.3  © 0277-4774 [employer]. Care instructions adapted under license by [employer] (which disclaims liability or warranty for this information). If you have questions about a medical condition or this instruction, always ask your healthcare professional. [place] [address] any warranty or liability for your use of this information.

## 2017-10-06 NOTE — ED PROVIDER NOTES
HPI Comments: 80 y.o. female with extensive past medical history, please see list, significant for macular degeneration who presents ambulatory from Urgent Care with chief complaint of left jaw pain. Pt states she tripped and fell last night, striking the left side of her jaw on a wooden drying rack. Pt was referred to the ED for further evaluation. Pt denies LOC or syncope. Pt states she used ice last night with some relief. Pt denies other injury. There are no other acute medical concerns at this time. Old Chart Review:  Pt evaluated at Urgent Care today. Note indicates pt had x-ray WNL and was referred to the ED to r/o jaw fracture based on exam and inability to tolerate foods well. Social hx: denies tobacco use; social EtOH use; denies illicit drug use  PCP: Tere Sabillon MD    Note written by Kathi Rousseau, as dictated by Karina López MD 6:20 PM        The history is provided by the patient. No  was used.         Past Medical History:   Diagnosis Date    Advance directive discussed with patient 06/22/2016    Allergic rhinitis     Bone spur 09/2013    Rt shoulder, Dr. Hillary Avelar impaction 12/20/15    [place] [facility] notes    Chronic pain     left hip pain in the am    Cystocele 10/20/14    with nocturia [facility] rec'd     DDD (degenerative disc disease)     Elevated lipids     Hematuria 10/2008    renal cyst found on ultrasound but acute cystitis [facility]    Laceration of foot 12/23/2016    flap type-sutures not done plain Td given    Left sided lacunar infarction ([address] 12/28/15    found on CT scan of head-old issue    Macular degeneration     VEI dr Violeta Johnston    OM (otitis media), acute 6/4/15    [facility] 10/[facility]15 E[facility] Chay Randall    Prolapse of female bladder, acquired 2012    Was treated with PT    Screening for glaucoma 02/27/2017 & 5/23/17    [place] [facility]    Swelling of limb, right     Thyroid nodule [id number]    colloid nodule dr Himanshu Carlton    Tinnitus 1/6/16     [facility] -neck mass being monitored    Urinary urgency 2015          3/9/15    Physical Therapy thru [facility] rec'd    Vitamin D deficiency 10/2013       Past Surgical History:   Procedure Laterality Date    BREAST SURGERY PROCEDURE UNLISTED  1980    cyst right breast    ECHOCARDIOGRAM  1/2004    mild LVH on echo    ENDOSCOPY, COLON, DIAGNOSTIC  2006    thru [facility]    HX CATARACT REMOVAL      left    HX CHOLECYSTECTOMY      HX GYN  2006    D and C    HX HEENT  4/2010    right cataract    HX OTHER SURGICAL  1/2009    foreign body granuloma dr Osman Sports Left 2/1/16    carotid dopplers done/old cva    HX UROLOGICAL  05/2015    pt states she has a prolapsed bladder but did not have any surgeries. she does kegel exercies          Family History:   Problem Relation Age of Onset   Manish Shield Cancer Sister      lung    Hypertension Sister     Stroke Brother     Heart Disease Brother     Cancer Sister      breast    Cancer Father      brain tumor       Social History     Social History    Marital status: SINGLE     Spouse name: N/A    Number of children: N/A    Years of education: N/A     Occupational History    Not on file. Social History Main Topics    Smoking status: Never Smoker    Smokeless tobacco: Never Used    Alcohol use Yes      Comment: social    Drug use: No    Sexual activity: Not Currently     Other Topics Concern    Not on file     Social History Narrative         ALLERGIES: Caffeine and Lisinopril-hydrochlorothiazide    Review of Systems   Constitutional: Negative for activity change, diaphoresis, fatigue and fever. HENT: Negative for congestion and sore throat. Left jaw pain   Eyes: Negative for photophobia and visual disturbance. Respiratory: Negative for chest tightness and shortness of breath. Cardiovascular: Negative for chest pain, palpitations and leg swelling.    Gastrointestinal: Negative for abdominal pain, blood in stool, constipation, diarrhea, nausea and vomiting. Genitourinary: Negative for difficulty urinating, dysuria, flank pain, frequency and hematuria. Musculoskeletal: Negative for back pain. Neurological: Negative for dizziness, syncope, numbness and headaches. Vitals:    10/06/17 1609   BP: 133/75   Pulse: (!) 58   Resp: 18   Temp: 98.1 °F (36.7 °C)   SpO2: 95%   Weight: 79.4 kg (175 lb)   Height: 5' 4\" (1.626 m)            Physical Exam   Constitutional: She is oriented to person, place, and time. She appears well-developed and well-nourished. No distress. HENT:   Head: Normocephalic and atraumatic. Nose: Nose normal.   Mouth/Throat: Oropharynx is clear and moist. No oropharyngeal exudate. Left mandible tenderness. No malocclusion. No trismus. Eyes: Conjunctivae and EOM are normal. Right eye exhibits no discharge. Left eye exhibits no discharge. No scleral icterus. Neck: Normal range of motion. Neck supple. No JVD present. No tracheal deviation present. No thyromegaly present. Cardiovascular: Normal rate, regular rhythm, normal heart sounds and intact distal pulses. Exam reveals no gallop and no friction rub. No murmur heard. Pulmonary/Chest: Effort normal and breath sounds normal. No stridor. No respiratory distress. She has no wheezes. She has no rales. She exhibits no tenderness. Abdominal: Bowel sounds are normal. She exhibits no distension and no mass. There is no tenderness. There is no rebound. Musculoskeletal: Normal range of motion. She exhibits no edema or tenderness. Lymphadenopathy:     She has no cervical adenopathy. Neurological: She is alert and oriented to person, place, and time. No cranial nerve deficit. Coordination normal.   Skin: Skin is warm and dry. No rash noted. She is not diaphoretic. No erythema. No pallor. Psychiatric: She has a normal mood and affect.  Her behavior is normal. Judgment and thought content normal. Nursing note and vitals reviewed. Note written by Kathi Oneill, as dictated by Suresh Ray MD 6:20 PM     MDM  Number of Diagnoses or Management Options  Closed fracture of left ramus of mandible, initial encounter [facility]):      Amount and/or Complexity of Data Reviewed  Tests in the radiology section of CPT®: ordered and reviewed  Review and summarize past medical records: yes    Risk of Complications, Morbidity, and/or Mortality  Presenting problems: moderate  Diagnostic procedures: moderate  Management options: moderate    Patient Progress  Patient progress: stable    ED Course       Procedures    CT Maxillofacial: Impression: There is a fracture of the left mandibular ramus extending into the  sigmoid notch. CT C-Spine: Impression: No fracture identified. There are degenerative changes as described above. There is 1.9 cm left thyroid nodule. 6:27 PM  Consult to oral surgery placed. 6:41 PM  Spoke with Doug Charles DDS, Oral Surgery. He agrees with plan to discharge patient home with f/u on Monday, 10/9/17. Call office at [phone] to schedule appointment.     7:06 PM  Discussed available lab and imaging results with patient. She verbalizes understanding and agrees with care plan. Will discharge patient home with return precautions; pain medications; soft diet as tolerate; f/u with Doug Charles DDS, oral surgery, on Monday 10/9/17 for further treatment. No results found for this or any previous visit (from the past 24 hour(s)). Xr Mandible Min 4 V    Result Date: 10/6/2017  INDICATION: Tripped on object fell and hit side of left jaw on wooden folding rack. EXAM: Mandible. FINDINGS: 4 views of the mandible show no fracture. TMJs are aligned. Soft tissues are unremarkable. IMPRESSION: No apparent fracture. Ct Maxillofacial Wo Cont    Result Date: 10/6/2017  EXAM:  CT MAXILLOFACIAL WO CONT INDICATION:   Hematoma, face; fall injury COMPARISON:  None.  CONTRAST: None. TECHNIQUE:  Multislice helical CT of the facial bones was performed in the axial plane without intravenous contrast administration. Coronal and sagittal reformations were generated. CT dose reduction was achieved through use of a standardized protocol tailored for this examination and automatic exposure control for dose modulation. FINDINGS: There is a nondisplaced fracture of the left mandibular ramus extending into the sigmoid notch. . The visualized paranasal sinuses and mastoid air cells are clear. The globes, optic nerves and extraocular muscles are normal. No abnormalities are identified within the visualized portions of the brain or nasopharynx. IMPRESSION: There is a fracture of the left mandibular ramus extending into the sigmoid notch. Ct Spine Cerv Wo Cont    Result Date: 10/6/2017  EXAM:  CT CERVICAL SPINE WITHOUT CONTRAST INDICATION:   fall injury. COMPARISON: None. CONTRAST:  None. TECHNIQUE: Multislice helical CT of the cervical spine was performed without intravenous contrast administration. Sagittal and coronal reconstructions were generated. CT dose reduction was achieved through use of a standardized protocol tailored for this examination and automatic exposure control for dose modulation. FINDINGS: The alignment is intact. No fracture is identified. There are degenerative changes in the facets on the left at C3-C4 and C4-C5 levels with narrowing of the neural foramina. At C5-C6, there is a small osteophyte/disc complex with moderate central stenosis. At C6-C7, there is mild to moderate central stenosis. There is a 1.9 cm nodule in the left thyroid. No soft tissue swelling is identified. IMPRESSION: No fracture identified. There are degenerative changes as described above. There is 1.9 cm left thyroid nodule. \      3:26 PM  The patient has been reevaluated. The patient is ready for discharge.  The patient's signs, symptoms, diagnosis, and discharge instructions have been discussed and the patient/ family has conveyed their understanding. The patient is to follow up as recommended or return to the ED should their symptoms worsen. Plan has been discussed and the patient is in agreement. LABORATORY TESTS:  No results found for this or any previous visit (from the past 12 hour(s)). IMAGING RESULTS:  CT SPINE CERV WO CONT   Final Result      CT MAXILLOFACIAL WO CONT   Final Result        No results found. MEDICATIONS GIVEN:  Medications - No data to display    IMPRESSION:  1. Closed fracture of left ramus of mandible, initial encounter ([facility] [address]        PLAN:  1. Discharge Medication List as of 10/6/2017  7:15 PM      START taking these medications    Details   traMADol (ULTRAM) 50 mg tablet Take 1 Tab by mouth every six (6) hours as needed for Pain for up to 3 days. Max Daily Amount: 200 mg., Print, Disp-12 Tab, R-0         CONTINUE these medications which have NOT CHANGED    Details   losartan (COZAAR) 50 mg tablet Take 1 Tab by mouth daily. , Normal, Disp-90 Tab, R-3      CHOLECALCIFEROL, VITAMIN D3, (VITAMIN D3 PO) Take 1 Tab by mouth daily. , Historical Med      peg 400-propylene glycol (SYSTANE) 0.4-0.3 % drop Administer 1 Drop to both eyes nightly., Historical Med      aspirin (ASPIRIN) 325 mg tablet Take 325 mg by mouth daily. Historical Med, 325 mg      fexofenadine (ALLEGRA) 180 mg tablet Take 180 mg by mouth daily. , Historical Med           2. Follow-up Information     Follow up With Details Comments [address], [place]   [address] [place] [address] [address]  [address] [facility] [address]  [phone]      [facility] [address]  [phone]    Concepcion Chicas DDS Schedule an appointment as soon as possible for a visit in 2 days for follow-up [phone]        3. Pt. Will f/u with Dr. Eufemia Godoy oral surgery. Advised only soft diet until follow up.     Return to ED for new or worsening symptoms Mari Witt MD

## 2017-10-06 NOTE — DISCHARGE INSTRUCTIONS
Head or Face Pain: Care Instructions  Your Care Instructions  Common causes of head or face pain are allergies, stress, and injuries. Other causes include tooth problems and sinus infections. Eating certain foods, such as chocolate or cheese, or drinking certain liquids, such as coffee or cola, can cause head pain for some people. If you have mild head pain, you may not need treatment. It is important to watch your symptoms and talk to your doctor if your pain continues or gets worse. Follow-up care is a key part of your treatment and safety. Be sure to make and go to all appointments, and call your doctor if you are having problems. It's also a good idea to know your test results and keep a list of the medicines you take. How can you care for yourself at home? · Take pain medicines exactly as directed. ¨ If the doctor gave you a prescription medicine for pain, take it as prescribed. ¨ If you are not taking a prescription pain medicine, ask your doctor if you can take an over-the-counter pain medicine. · Take it easy for the next few days or longer if you are not feeling well. · Use a warm, moist towel or heating pad set on low to relax tight muscles in your shoulder and neck. Have someone gently massage your neck and shoulders. · Put ice or a cold pack on the area for 10 to 20 minutes at a time. Put a thin cloth between the ice and your skin. When should you call for help? Call 911 anytime you think you may need emergency care. For example, call if:  · You have twitching, jerking, or a seizure. · You passed out (lost consciousness). · You have symptoms of a stroke. These may include:  ¨ Sudden numbness, tingling, weakness, or loss of movement in your face, arm, or leg, especially on only one side of your body. ¨ Sudden vision changes. ¨ Sudden trouble speaking. ¨ Sudden confusion or trouble understanding simple statements. ¨ Sudden problems with walking or balance.   ¨ A sudden, severe headache that is different from past headaches. · You have jaw pain and pain in your chest, shoulder, neck, or arm. Call your doctor now or seek immediate medical care if:  · You have a fever with a stiff neck or a severe headache. · You have nausea and vomiting, or you cannot keep food or liquids down. Watch closely for changes in your health, and be sure to contact your doctor if:  · Your head or face pain does not get better as expected. Where can you learn more? Go to http://concepcion-richard.info/. Enter P568 in the search box to learn more about \"Head or Face Pain: Care Instructions. \"  Current as of: March 20, 2017  Content Version: 11.3  © 8236-9341 [employer]. Care instructions adapted under license by [employer] (which disclaims liability or warranty for this information). If you have questions about a medical condition or this instruction, always ask your healthcare professional. [address] any warranty or liability for your use of this information.

## 2017-10-06 NOTE — UC NOTE
After being seen by provider pt advised to be evaluated in the emergency department.  Report called to Nir Cotton at [facility] ED
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
## 2017-10-09 ENCOUNTER — PATIENT OUTREACH (OUTPATIENT)
Dept: FAMILY MEDICINE CLINIC | Age: 82
End: 2017-10-09

## 2017-10-09 NOTE — PROGRESS NOTES
NNTOCED    Referral Source: [facility] Report (10/6/17) & CDR- MSSP (10/9/17). Seen @ [facility] [address] referred to [facility] ED for further evaluation.     Diagnoses:  Fall, initial encounter   Jaw pain    Imaging:  -Xr Mandible Min 4 V- Impression: no apparent fracture  - Ct Maxillofacial Wo Cont- IMPRESSION: There is a fracture of the left mandibular ramus extending into the sigmoid notch. - Ct Spine Cerv Wo Cont- IMPRESSION: No fracture identified. There are degenerative changes as described above. There is 1.9 cm left thyroid nodule. Discharge Plan/Instructions:  - Disposition- home  - Rx-   traMADol (ULTRAM) 50 mg tablet  Take 1 Tab by mouth every six (6) hours as needed for Pain for up to 3 days. Max Daily Amount: 200 mg. 12 Tab   - F/U   Newt Eliana, DDS Schedule an appointment as soon as possible for a visit in 2 days for follow-up [phone]         Discharge Instructions        Pt contacted. Pt ID verified with 2 identifiers, name and . NN introduction. Reason for call stated. - Pt reported \"feeling ok. No pain except when I open my mouth to put food in, when I chew, & if I talk too long\". Pt reported not following up with Oral Surgeon referred by ED provider. Not in her area. Too far. Spoke with her Dentist. Referred to Oral Surgeon closer to pt's home. Goals Addressed      Identification of barriers to adherence to a plan of care such as inability to afford medications, lack of insurance, lack of transportation, etc.                  10/9/17- pt has Medicare, denies any barriers to obtaining meds, denies any barriers with transportation. Usually drives self, but today someone is driving her to appt. No other needs/barriers identified @ this time-ID       Knowledge and adherence to medication plan. 10/9/17- pt reported was given Rx for pain med by ED provider. Reported did not fill.  Has taken  mg if needed with relief-ID       Supportive resources in place to maintain patient in the community (ie., home health, equipment, DME, refer to, etc.)                  10/9/17- pt reported Oral Surgeon referred by ED provider located in [facility]. Pt resides in [facility] area. Checked with her dentist's office. Referred to [facility] in [place] office. Appt scheduled for today @ 1:45 PM. Pt unable to recall Provider name @ this time-ID           Pt given NN contact information for any questions, concerns, needs.     Plan:  - Oral Surgery f/u [facility] @ [facility] @ 2:45 PM  - Pt to contact this NN on 10/1017 to update on visit  - Schedule PCP f/u if indicated  - NN to f/u as needed during 30 day PATY period    NN contact information given for questions, concerns, needs.     Goals:

## 2018-01-23 ENCOUNTER — OFFICE VISIT (OUTPATIENT)
Dept: CARDIOLOGY CLINIC | Age: 83
End: 2018-01-23

## 2018-01-23 VITALS
DIASTOLIC BLOOD PRESSURE: 70 MMHG | BODY MASS INDEX: 31.07 KG/M2 | HEART RATE: 90 BPM | WEIGHT: 181 LBS | OXYGEN SATURATION: 94 % | SYSTOLIC BLOOD PRESSURE: 144 MMHG

## 2018-01-23 DIAGNOSIS — I49.3 PVCS (PREMATURE VENTRICULAR CONTRACTIONS): ICD-10-CM

## 2018-01-23 DIAGNOSIS — I10 ESSENTIAL HYPERTENSION: ICD-10-CM

## 2018-01-23 DIAGNOSIS — I69.398 CVA, OLD, DISTURBANCES OF VISION: ICD-10-CM

## 2018-01-23 DIAGNOSIS — I49.5 SSS (SICK SINUS SYNDROME) (HCC): Primary | ICD-10-CM

## 2018-01-23 DIAGNOSIS — I65.23 CAROTID ARTERY PLAQUE, BILATERAL: ICD-10-CM

## 2018-01-23 DIAGNOSIS — I44.0 HEART BLOCK AV FIRST DEGREE: ICD-10-CM

## 2018-01-23 DIAGNOSIS — H53.9 CVA, OLD, DISTURBANCES OF VISION: ICD-10-CM

## 2018-01-23 NOTE — MR AVS SNAPSHOT
[facility] 
[phone] Patient: Lam  MRN:  [id number] Visit Information Date & Time Provider Department Dept. Phone Encounter #  
 1/23/2018 11:20 AM Gina Olson MD [facility] Danay Trimble [phone] [id number] Upcoming Health Maintenance Date Due Influenza Age 5 to Adult 8/1/2017 BREAST CANCER SCRN MAMMOGRAM 1/31/2018 MEDICARE YEARLY EXAM 1/24/2018 GLAUCOMA SCREENING Q2Y 5/23/2019 DTaP/Tdap/Td series (2 - Td) 6/26/2027 Allergies as of 1/23/2018  Review Complete On: 1/23/2018 By: Harsh Panda RN Severity Noted Reaction Type Reactions Caffeine  03/31/2010    Other (comments)  
 insomnia Lisinopril-hydrochlorothiazide  03/27/2013    Rash Current Immunizations  Reviewed on 6/26/2017 Name Date Td 12/23/2016 Not reviewed this visit Vitals BP Pulse Weight(growth percentile) SpO2 BMI OB Status 144/70 (BP 1 Location: Right arm, BP Patient Position: Sitting) 90 181 lb (82.1 kg) 94% 31.07 kg/m2 Postmenopausal  
 Smoking Status Never Smoker BMI and BSA Data Body Mass Index Body Surface Area 31.07 kg/m 2 1.93 m 2 Preferred Pharmacy Pharmacy Name Phone [id number] [address] [place] [address] [phone] Your Updated Medication List  
  
   
This list is accurate as of: 1/23/18 11:45 AM.  Always use your most recent med list. ALLEGRA 180 mg tablet Generic drug:  fexofenadine Take 180 mg by mouth daily. aspirin 325 mg tablet Commonly known as:  ASPIRIN Take 325 mg by mouth daily. losartan 50 mg tablet Commonly known as:  COZAAR Take 1 Tab by mouth daily. peg 400-propylene glycol 0.4-0.3 % Drop Commonly known as:  SYSTANE Administer 1 Drop to both eyes nightly. VITAMIN D3 PO Take 1 Tab by mouth daily. Introducing [facility]! Dear Radha Whaley: 
Thank you for requesting a [facility] account. Our records indicate that you have previously registered for a [facility] account but its currently inactive. Please call our [facility] support line at [phone]. Additional Information If you have questions, please visit the Frequently Asked Questions section of the [facility] website at [email or web address] Remember, [facility] is NOT to be used for urgent needs. For medical emergencies, dial 911. Now available from your iPhone and Android! Please provide this summary of care documentation to your next provider. Your primary care clinician is listed as [phone] If you have any questions after today's visit, please call [phone].
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
## 2018-02-06 DIAGNOSIS — R92.8 ABNORMALITY OF LEFT BREAST ON SCREENING MAMMOGRAM: Primary | ICD-10-CM

## 2018-02-13 ENCOUNTER — OFFICE VISIT (OUTPATIENT)
Dept: FAMILY MEDICINE CLINIC | Age: 83
End: 2018-02-13

## 2018-02-13 VITALS
BODY MASS INDEX: 30.15 KG/M2 | HEIGHT: 64 IN | OXYGEN SATURATION: 97 % | RESPIRATION RATE: 16 BRPM | WEIGHT: 176.6 LBS | TEMPERATURE: 95.6 F | SYSTOLIC BLOOD PRESSURE: 159 MMHG | HEART RATE: 53 BPM | DIASTOLIC BLOOD PRESSURE: 72 MMHG

## 2018-02-13 DIAGNOSIS — N81.10 FEMALE BLADDER PROLAPSE: ICD-10-CM

## 2018-02-13 DIAGNOSIS — I49.5 SSS (SICK SINUS SYNDROME) (HCC): ICD-10-CM

## 2018-02-13 DIAGNOSIS — H35.30 MACULAR DEGENERATION (SENILE) OF RETINA: ICD-10-CM

## 2018-02-13 DIAGNOSIS — Z00.00 MEDICARE ANNUAL WELLNESS VISIT, SUBSEQUENT: Primary | ICD-10-CM

## 2018-02-13 DIAGNOSIS — I10 ELEVATED BLOOD PRESSURE READING IN OFFICE WITH DIAGNOSIS OF HYPERTENSION: ICD-10-CM

## 2018-02-13 DIAGNOSIS — B00.9 RECURRENT NONGENITAL HERPES SIMPLEX VIRUS (HSV) INFECTION: ICD-10-CM

## 2018-02-13 DIAGNOSIS — I10 ESSENTIAL HYPERTENSION: ICD-10-CM

## 2018-02-13 PROBLEM — S02.609A JAW FRACTURE (HCC): Status: RESOLVED | Noted: 2017-11-01 | Resolved: 2018-02-13

## 2018-02-13 NOTE — ACP (ADVANCE CARE PLANNING)
Advance Care Planning    Advance Care Planning (ACP) Provider Conversation Snapshot    Date of ACP Conversation: 02/13/18  Persons included in Conversation:  patient  Length of ACP Conversation in minutes:  <16 minutes (Non-Billable)    Authorized Decision Maker (if patient is incapable of making informed decisions): This person is:    Other Legally Authorized Decision Maker (e.g. Next of Kin)  Daughter-Roberta        For Patients with Decision Making Capacity:   Values/Goals: Exploration of values, goals, and preferences if recovery is not expected, even with continued medical treatment in the event of:  Imminent death  Severe, permanent brain injury    Conversation Outcomes / Follow-Up Plan:   Recommended completion of Advance Directive form after review of ACP materials and conversation with prospective healthcare agent

## 2018-02-13 NOTE — PROGRESS NOTES
Virgie Chawla  Identified pt with two pt identifiers(name and ). Chief Complaint   Patient presents with   [employer] Annual Wellness Visit       1. Have you been to the ER, urgent care clinic since your last visit? Hospitalized since your last visit? CH[facility]ISE   Jaw fracture [facility]    2. Have you seen or consulted any other health care providers outside of the [address] [place] [address] since your last visit? Include any pap smears or colon screening. NO    Today's provider has been notified of reason for visit, vitals and flowsheets obtained on patients. Patient received paperwork for advance directive during previous visit but has not completed at this time     Reviewed record In preparation for visit, huddled with provider and have obtained necessary documentation      Health Maintenance Due   Topic    Influenza Age 5 to Adult     MEDICARE YEARLY EXAM     BREAST CANCER SCRN MAMMOGRAM        Wt Readings from Last 3 Encounters:   18 176 lb 9.6 oz (80.1 kg)   18 181 lb (82.1 kg)   10/06/17 175 lb (79.4 kg)     Temp Readings from Last 3 Encounters:   10/06/17 98.1 °F (36.7 °C)   10/06/17 98.4 °F (36.9 °C)   17 98.2 °F (36.8 °C)     BP Readings from Last 3 Encounters:   18 144/70   10/06/17 133/75   10/06/17 133/70     Pulse Readings from Last 3 Encounters:   18 90   10/06/17 (!) 58   10/06/17 (!) 56     Vitals:    18 1040   Weight: 176 lb 9.6 oz (80.1 kg)   Height: 5' 4\" (1.626 m)   PainSc:   0 - No pain         Learning Assessment:  :     No flowsheet data found. Depression Screening:  :     PHQ over the last two weeks 2018   Little interest or pleasure in doing things Not at all   Feeling down, depressed or hopeless Not at all   Total Score PHQ 2 0       Fall Risk Assessment:  :     Fall Risk Assessment, last 12 mths 2018   Able to walk? Yes   Fall in past 12 months? Yes   Fall with injury?  Yes   Number of falls in past 12 months 1   Fall Risk Score 2       Abuse Screening:  :     Abuse Screening Questionnaire 2/13/2018   Do you ever feel afraid of your partner? N   Are you in a relationship with someone who physically or mentally threatens you? N   Is it safe for you to go home? Y       ADL Screening:  :     ADL Assessment 2/13/2018   Feeding yourself No Help Needed   Getting from bed to chair No Help Needed   Getting dressed No Help Needed   Bathing or showering No Help Needed   Walk across the room (includes cane/walker) No Help Needed   Using the telphone No Help Needed   Taking your medications No Help Needed   Preparing meals No Help Needed   Managing money (expenses/bills) No Help Needed   Moderately strenuous housework (laundry) No Help Needed   Shopping for personal items (toiletries/medicines) No Help Needed   Shopping for groceries No Help Needed   Driving No Help Needed   Climbing a flight of stairs No Help Needed   Getting to places beyond walking distances No Help Needed         Complete Physical Exam Female  Pre-Visit Questions:    1. Do you follow a low fat or low salt diet ? n  2. Do you follow an exercise program? n  3. Have you had your tetanus booster in the last 10 years? y  3. Have you ever had a Pneumonia vaccine? n  5. Do you smoke? n  6. Do you consider yourself overweight? n  7. Do you perform Breast self exam?y  8. Is there a family history of CAD< age 48? n  5. Is there a family history of Cancer? y  8. Do you have any Cancer risks? n  11. Have you had a colonoscopy? y  15. Have you been to your eye doctor past year?  y   15. Have you been to your dentist in the last 6 months?  y  15. Have you had your flu shot for this season? n   13. Have you had a Pap smear in the last 3 years?n  16. Have you had your annual mammogram?y  17. Have you had a bone density scan(DEXA)? n        Medication reconciliation up to date and corrected with patient at this time.

## 2018-02-13 NOTE — MR AVS SNAPSHOT
[address] [place] [address] 
 
 
 [address] [facility] 
[address] [place] [id number] 
[phone] Patient: Husam Ferreira MRN:  [id number] Visit Information Date & Time Provider Department Dept. Phone Encounter #  
 2/13/2018 10:40 AM Adolfo Zelaya MD [facility] [phone] [id number] Follow-up Instructions Return in about 1 year (around 2/13/2019) for [address] Upcoming Health Maintenance Date Due  
 MEDICARE YEARLY EXAM 1/24/2018 BREAST CANCER SCRN MAMMOGRAM 1/31/2018 GLAUCOMA SCREENING Q2Y 5/23/2019 DTaP/Tdap/Td series (2 - Td) 6/26/2027 Allergies as of 2/13/2018  Review Complete On: 2/13/2018 By: Yo Goetz RN Severity Noted Reaction Type Reactions Caffeine  03/31/2010    Other (comments)  
 insomnia Lisinopril-hydrochlorothiazide  03/27/2013    Rash Current Immunizations  Reviewed on 2/13/2018 Name Date Td 12/23/2016 Reviewed by Yo Goetz RN on 2/13/2018 at 10:47 AM  
You Were Diagnosed With   
  
 Codes Comments Essential hypertension    -  Primary ICD-10-CM: I10 
ICD-9-CM: 401.9 Elevated blood pressure reading in office with diagnosis of hypertension     ICD-10-CM: I10 
ICD-9-CM: 401.9 SSS (sick sinus syndrome) (HCC)     ICD-10-CM: I49.5 ICD-9-CM: 427.81 Vitals BP Pulse Temp Resp Height(growth percentile) Weight(growth percentile) 159/72 (BP 1 Location: Left arm, BP Patient Position: Sitting) (!) 53 95.6 °F (35.3 °C) (Oral) 16 5' 4\" (1.626 m) 176 lb 9.6 oz (80.1 kg) SpO2 BMI OB Status Smoking Status 97% 30.31 kg/m2 Postmenopausal Never Smoker Vitals History BMI and BSA Data Body Mass Index Body Surface Area  
 30.31 kg/m 2 1.9 m 2 Preferred Pharmacy Pharmacy Name Phone [id number] [address] [place] [address] [phone] Your Updated Medication List  
  
   
 This list is accurate as of: 2/13/18 11:27 AM.  Always use your most recent med list. ALLEGRA 180 mg tablet Generic drug:  fexofenadine Take 180 mg by mouth daily. aspirin 325 mg tablet Commonly known as:  ASPIRIN Take 325 mg by mouth daily. losartan 50 mg tablet Commonly known as:  COZAAR Take 1 Tab by mouth daily. peg 400-propylene glycol 0.4-0.3 % Drop Commonly known as:  SYSTANE Administer 1 Drop to both eyes nightly. VITAMIN D3 PO Take 1 Tab by mouth daily. Follow-up Instructions Return in about 1 year (around 2/13/2019) for Geovanna Kendall Patient Instructions Medicare Wellness Visit, Female The best way to live healthy is to have a healthy lifestyle by eating a well-balanced diet, exercising regularly, limiting alcohol and stopping smoking. Regular physical exams and screening tests are another way to keep healthy. Preventive exams provided by your health care provider can find health problems before they become diseases or illnesses. Preventive services including immunizations, screening tests, monitoring and exams can help you take care of your own health. All people over age 72 should have a pneumovax  and and a prevnar shot to prevent pneumonia. These are once in a lifetime unless you and your provider decide differently. All people over 65 should have a yearly flu shot and a tetanus vaccine every 10 years. A bone mass density to screen for osteoporosis or thinning of the bones should be done every 2 years after 65. Screening for diabetes mellitus with a blood sugar test should be done every year. Glaucoma is a disease of the eye due to increased ocular pressure that can lead to blindness and it should be done every year by an eye professional. 
 
Cardiovascular screening tests that check for elevated lipids (fatty part of blood) which can lead to heart disease and strokes should be done every 5 years. Colorectal screening that evaluates for blood or polyps in your colon should be done yearly as a stool test or every five years as a flexible sigmoidoscope or every 10 years as a colonoscopy up to age 76. Breast cancer screening with a mammogram is recommended biennially  for women age 54-69. Screening for cervical cancer with a pap smear and pelvic exam is recommended for women after age 72 years every 2 years up to age 79 or when the provider and patient decide to stop. If there is a history of cervical abnormalities or other increased risk for cancer then the test is recommended yearly. Hepatitis C screening is also recommended for anyone born between [birth year] through [address]. A shingles vaccine is also recommended once in a lifetime after age 61. Your Medicare Wellness Exam is recommended annually. Here is a list of your current Health Maintenance items with a due date: 
Health Maintenance Due Topic Date Due  
 Annual Well Visit  01/24/2018  Breast Cancer Screening  01/31/2018 [facility]! Dear Krystin Jean: 
Thank you for requesting a [employer] account. Our records indicate that you have previously registered for a [employer] account but its currently inactive. Please call our [employer] support line at [phone]. Additional Information If you have questions, please visit the Frequently Asked Questions section of the [employer] website at [email or web address] Remember, [employer] is NOT to be used for urgent needs. For medical emergencies, dial 911. Now available from your iPhone and Android! Please provide this summary of care documentation to your next provider. Your primary care clinician is listed as [phone] If you have any questions after today's visit, please call [phone].

## 2018-02-13 NOTE — PROGRESS NOTES
This is a Subsequent Medicare Annual Wellness Exam (AWV) (Performed 12 months after IPPE or effective date of Medicare Part B enrollment)    I have reviewed the patient's medical history in detail and updated the computerized patient record. Eye doctor q 1-2 mos MD.  Dentist 2 x annually. Colonoscopy '75. Done. Gyn exam '14. Mammo annually. Card annually. Prolapsed bladder. Stopped 1 yr ago. Doing Kegel's. Sees ENT q 2 mos for left ear scraping. Right arm lymphedema. No signif hx past yr. Gets blisters on buttocks infrequently. No regular exercise. Has new male friend. No activity at present. Has concerns.       History     Past Medical History:   Diagnosis Date    Advance directive discussed with patient 06/22/2016    Allergic rhinitis     Bone spur 09/2013    Rt shoulder, Dr. Hall Amen impaction 12/20/15    [facility] notes    Chronic pain     left hip pain in the am    Cystocele 10/20/14    with nocturia [facility] rec'd     DDD (degenerative disc disease)     Elevated lipids     Hematuria 10/2008    renal cyst found on ultrasound but acute cystitis [facility]    Jaw fracture ([address] 11/2017    Laceration of foot 12/23/2016    flap type-sutures not done plain Td given    Left sided lacunar infarction ([address] 12/28/15    found on CT scan of head-old issue    Macular degeneration     VEI dr Osorio Palmer    OM (otitis media), acute 6/4/15    I[facility] and 10/14/15 ENT Chip Mitchell    Prolapse of female bladder, acquired 2012    Was treated with PT    Screening for glaucoma 02/27/2017 & 5/23/17    [place] TERRY    Swelling of limb, right     Thyroid nodule 204361    colloid nodule dr Dino Napoles    Tinnitus 1/6/16     Twilla Zaki -neck mass being monitored    Urinary urgency 2015          3/9/15    Physical Therapy thru [facility] rec'd    Vitamin D deficiency 10/2013      Past Surgical History:   Procedure Laterality Date    BREAST SURGERY PROCEDURE UNLISTED  1980    cyst right breast    ECHOCARDIOGRAM  1/2004    mild LVH on echo    ENDOSCOPY, COLON, DIAGNOSTIC  2006    [facility]ru [facility]    HX CATARACT REMOVAL      left    HX CHOLECYSTECTOMY      HX GYN  2006    D and C    HX HEENT  4/2010    right cataract    HX OTHER SURGICAL  1/2009    foreign body granuloma dr Yves Chirinos Left 2/1/16    carotid dopplers done/old [facility]va    HX UROLOGICAL  05/2015    pt states she has a prolapsed bladder but did not have any surgeries. she does kegel exercies      Current Outpatient Prescriptions   Medication Sig Dispense Refill    losartan (COZAAR) 50 mg tablet Take 1 Tab by mouth daily. 90 Tab 3    CHOLECALCIFEROL, VITAMIN D3, (VITAMIN D3 PO) Take 1 Tab by mouth daily.  peg 400-propylene glycol (SYSTANE) 0.4-0.3 % drop Administer 1 Drop to both eyes nightly.  aspirin (ASPIRIN) 325 mg tablet Take 325 mg by mouth daily.  fexofenadine (ALLEGRA) 180 mg tablet Take 180 mg by mouth daily.        Allergies   Allergen Reactions    Caffeine Other (comments)     insomnia    Lisinopril-Hydrochlorothiazide Rash     Family History   Problem Relation Age of Onset   Palomo Bailey Cancer Sister      lung    Hypertension Sister     Stroke Brother     Heart Disease Brother    Palomo Bailey Cancer Sister      breast    Cancer Father      brain tumor     Social History   Substance Use Topics    Smoking status: Never Smoker    Smokeless tobacco: Never Used    Alcohol use Yes      Comment: social     Patient Active Problem List   Diagnosis Code    Vitamin D deficiency E55.9    BP (high blood pressure) I10    PVCs (premature ventricular contractions) I49.3    Heart block AV first degree I44.0    SSS [facility]sick sinus syndrome) (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 Functional Ability and Level of Safety:   Hearing Loss  The patient needs further evaluation. Activities of Daily Living  The home contains: handrails  Patient does total self care    Fall Risk  Fall Risk Assessment, last 12 mths 2/13/2018   Able to walk? Yes   Fall in past 12 months? Yes   Fall with injury? Yes   Number of falls in past 12 months 1   Fall Risk Score 2       Abuse Screen  Patient is not abused    Cognitive Screening   Evaluation of Cognitive Function:  Has your family/caregiver stated any concerns about your memory: yes  Normal    Patient Care Team   Patient Care Team:  Jerrod Lara MD as PCP - [address], MD (Endocrinology)  Missy Cr MD (Ophthalmology)  Anel Rg MD (Cardiology)  Debbrah Severs, MD (Otolaryngology)  Myrna Mullen RN as Ambulatory Care Navigator (Family Practice)  Srini Sanchez MD (Ophthalmology)    Assessment/Plan   Education and counseling provided:  Are appropriate based on today's review and evaluation  End-of-Life planning (with patient's consent)  Screening Mammography  Colorectal cancer screening tests  Cardiovascular screening blood test  Screening for glaucoma  Diabetes screening test    Diagnoses and all orders for this visit:    1. Medicare annual wellness visit, subsequent    2. Essential hypertension    3. Elevated blood pressure reading in office with diagnosis of hypertension    4. SSS (sick sinus syndrome) ([facility]  Assessment & Plan: This condition is managed by Specialist.  Key CAD CHF Meds             losartan (COZAAR) 50 mg tablet  (Taking) Take 1 Tab by mouth daily. aspirin (ASPIRIN) 325 mg tablet  (Taking) Take 325 mg by mouth daily. Key Antihyperlipidemia Meds     The patient is on no antihyperlipidemia meds. Lab Results   Component Value Date/Time    Sodium 142 06/26/2017 11:42 AM    Potassium 5.0 06/26/2017 11:42 AM    Cholesterol, total 274 06/26/2017 11:42 AM    HDL Cholesterol 73 06/26/2017 11:42 AM    LDL, calculated 191 06/26/2017 11:42 AM    Triglyceride 49 06/26/2017 11:42 AM         5. Macular degeneration (senile) of retina    6. Female bladder prolapse    7.  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

## 2018-02-13 NOTE — ASSESSMENT & PLAN NOTE
This condition is managed by Specialist.  Key CAD CHF Meds             losartan (COZAAR) 50 mg tablet  (Taking) Take 1 Tab by mouth daily. aspirin (ASPIRIN) 325 mg tablet  (Taking) Take 325 mg by mouth daily. Key Antihyperlipidemia Meds     The patient is on no antihyperlipidemia meds.         Lab Results   Component Value Date/Time    Sodium 142 06/26/2017 11:42 AM    Potassium 5.0 06/26/2017 11:42 AM    Cholesterol, total 274 06/26/2017 11:42 AM    HDL Cholesterol 73 06/26/2017 11:42 AM    LDL, calculated 191 06/26/2017 11:42 AM    Triglyceride 49 06/26/2017 11:42 AM

## 2018-04-17 DIAGNOSIS — I49.3 PVCS (PREMATURE VENTRICULAR CONTRACTIONS): ICD-10-CM

## 2018-04-17 DIAGNOSIS — I44.0 HEART BLOCK AV FIRST DEGREE: ICD-10-CM

## 2018-04-17 RX ORDER — LOSARTAN POTASSIUM 50 MG/1
50 TABLET ORAL DAILY
Qty: 90 TAB | Refills: 3 | Status: SHIPPED | OUTPATIENT
Start: 2018-04-17 | End: 2019-01-08 | Stop reason: SDUPTHER

## 2018-04-17 NOTE — TELEPHONE ENCOUNTER
Requested Prescriptions     Signed Prescriptions Disp Refills    losartan (COZAAR) 50 mg tablet 90 Tab 3     Sig: Take 1 Tab by mouth daily.      Authorizing Provider: Domitila Magdaleno     Ordering User: Micheal Jacobsen    Per Dr. Amil Gottron verbal order

## 2018-06-21 ENCOUNTER — LAB ONLY (OUTPATIENT)
Dept: FAMILY MEDICINE CLINIC | Age: 83
End: 2018-06-21

## 2018-06-21 DIAGNOSIS — I10 ESSENTIAL HYPERTENSION: ICD-10-CM

## 2018-06-21 DIAGNOSIS — E55.9 VITAMIN D DEFICIENCY: Primary | ICD-10-CM

## 2018-06-22 LAB
25(OH)D3+25(OH)D2 SERPL-MCNC: 45 NG/ML (ref 30–100)
ALBUMIN SERPL-MCNC: 3.7 G/DL (ref 3.5–4.7)
ALBUMIN/GLOB SERPL: 1.4 {RATIO} (ref 1.2–2.2)
ALP SERPL-CCNC: 58 IU/L (ref 39–117)
ALT SERPL-CCNC: 8 IU/L (ref 0–32)
APPEARANCE UR: ABNORMAL
AST SERPL-CCNC: 15 IU/L (ref 0–40)
BACTERIA #/AREA URNS HPF: ABNORMAL /[HPF]
BASOPHILS # BLD AUTO: 0 X10E3/UL (ref 0–0.2)
BASOPHILS NFR BLD AUTO: 1 %
BILIRUB SERPL-MCNC: 0.4 MG/DL (ref 0–1.2)
BILIRUB UR QL STRIP: NEGATIVE
BUN SERPL-MCNC: 15 MG/DL (ref 8–27)
BUN/CREAT SERPL: 21 (ref 12–28)
CALCIUM SERPL-MCNC: 9.5 MG/DL (ref 8.7–10.3)
CASTS URNS QL MICRO: ABNORMAL /LPF
CHLORIDE SERPL-SCNC: 104 MMOL/L (ref 96–106)
CHOLEST SERPL-MCNC: 260 MG/DL (ref 100–199)
CO2 SERPL-SCNC: 27 MMOL/L (ref 20–29)
COLOR UR: YELLOW
CREAT SERPL-MCNC: 0.73 MG/DL (ref 0.57–1)
EOSINOPHIL # BLD AUTO: 0.1 X10E3/UL (ref 0–0.4)
EOSINOPHIL NFR BLD AUTO: 3 %
EPI CELLS #/AREA URNS HPF: ABNORMAL /HPF
ERYTHROCYTE [DISTWIDTH] IN BLOOD BY AUTOMATED COUNT: 15.9 % (ref 12.3–15.4)
GFR SERPLBLD CREATININE-BSD FMLA CKD-EPI: 75 ML/MIN/1.73
GFR SERPLBLD CREATININE-BSD FMLA CKD-EPI: 87 ML/MIN/1.73
GLOBULIN SER CALC-MCNC: 2.6 G/DL (ref 1.5–4.5)
GLUCOSE SERPL-MCNC: 91 MG/DL (ref 65–99)
GLUCOSE UR QL: NEGATIVE
HCT VFR BLD AUTO: 38.3 % (ref 34–46.6)
HDLC SERPL-MCNC: 63 MG/DL
HGB BLD-MCNC: 12.2 G/DL (ref 11.1–15.9)
HGB UR QL STRIP: ABNORMAL
IMM GRANULOCYTES # BLD: 0 X10E3/UL (ref 0–0.1)
IMM GRANULOCYTES NFR BLD: 0 %
INTERPRETATION, 910389: NORMAL
KETONES UR QL STRIP: NEGATIVE
LDLC SERPL CALC-MCNC: 185 MG/DL (ref 0–99)
LEUKOCYTE ESTERASE UR QL STRIP: ABNORMAL
LYMPHOCYTES # BLD AUTO: 2 X10E3/UL (ref 0.7–3.1)
LYMPHOCYTES NFR BLD AUTO: 50 %
MCH RBC QN AUTO: 25.3 PG (ref 26.6–33)
MCHC RBC AUTO-ENTMCNC: 31.9 G/DL (ref 31.5–35.7)
MCV RBC AUTO: 79 FL (ref 79–97)
MICRO URNS: ABNORMAL
MONOCYTES # BLD AUTO: 0.3 X10E3/UL (ref 0.1–0.9)
MONOCYTES NFR BLD AUTO: 8 %
MUCOUS THREADS URNS QL MICRO: PRESENT
NEUTROPHILS # BLD AUTO: 1.5 X10E3/UL (ref 1.4–7)
NEUTROPHILS NFR BLD AUTO: 38 %
NITRITE UR QL STRIP: POSITIVE
PH UR STRIP: 6.5 [PH] (ref 5–7.5)
PLATELET # BLD AUTO: 210 X10E3/UL (ref 150–379)
POTASSIUM SERPL-SCNC: 4.5 MMOL/L (ref 3.5–5.2)
PROT SERPL-MCNC: 6.3 G/DL (ref 6–8.5)
PROT UR QL STRIP: ABNORMAL
RBC # BLD AUTO: 4.83 X10E6/UL (ref 3.77–5.28)
RBC #/AREA URNS HPF: ABNORMAL /HPF
SODIUM SERPL-SCNC: 143 MMOL/L (ref 134–144)
SP GR UR: 1.02 (ref 1–1.03)
TRIGL SERPL-MCNC: 58 MG/DL (ref 0–149)
TSH SERPL DL<=0.005 MIU/L-ACNC: 2.72 UIU/ML (ref 0.45–4.5)
UROBILINOGEN UR STRIP-MCNC: 1 MG/DL (ref 0.2–1)
VLDLC SERPL CALC-MCNC: 12 MG/DL (ref 5–40)
WBC # BLD AUTO: 4 X10E3/UL (ref 3.4–10.8)
WBC #/AREA URNS HPF: ABNORMAL /HPF

## 2018-06-29 ENCOUNTER — TELEPHONE (OUTPATIENT)
Dept: FAMILY MEDICINE CLINIC | Age: 83
End: 2018-06-29

## 2018-06-29 NOTE — TELEPHONE ENCOUNTER
Spoke with patient after obtaining 2 patient identifiers  Patient made aware of lab results and to come in when she can to give another urine sample. Verbalized understanding with no further questions noted.

## 2018-06-29 NOTE — TELEPHONE ENCOUNTER
----- Message from Elizabeth Myers sent at 6/29/2018 10:56 AM EDT -----  Regarding: Dr. Roderick Reyes  Patient returning a call back regarding a urine culture.  Contact is [phone]

## 2018-07-18 ENCOUNTER — LAB ONLY (OUTPATIENT)
Dept: FAMILY MEDICINE CLINIC | Age: 83
End: 2018-07-18

## 2018-07-18 DIAGNOSIS — R82.81 PYURIA: Primary | ICD-10-CM

## 2018-07-21 LAB — BACTERIA UR CULT: ABNORMAL

## 2018-07-23 ENCOUNTER — TELEPHONE (OUTPATIENT)
Dept: FAMILY MEDICINE CLINIC | Age: 83
End: 2018-07-23

## 2018-07-23 RX ORDER — SULFAMETHOXAZOLE AND TRIMETHOPRIM 800; 160 MG/1; MG/1
1 TABLET ORAL 2 TIMES DAILY
Qty: 6 TAB | Refills: 0 | Status: SHIPPED | OUTPATIENT
Start: 2018-07-23 | End: 2018-07-26

## 2018-07-23 NOTE — TELEPHONE ENCOUNTER
----- Message from Norma Ortiz sent at 7/23/2018  5:04 PM EDT -----  Regarding: Dr. Curtis Ayers is requesting a call back in regards to her antibiotic not being at [address] [facility] as she was told.  Best contact number: [phone]

## 2018-07-23 NOTE — PROGRESS NOTES
Spoke with patient after obtaining 2 patient identifiers  Patient made aware of ABT. Verbalized understanding. All questions answered.

## 2018-07-30 DIAGNOSIS — N39.0 URINARY TRACT INFECTION WITHOUT HEMATURIA, SITE UNSPECIFIED: Primary | ICD-10-CM

## 2018-08-01 ENCOUNTER — LAB ONLY (OUTPATIENT)
Dept: FAMILY MEDICINE CLINIC | Age: 83
End: 2018-08-01

## 2018-08-01 DIAGNOSIS — N39.0 URINARY TRACT INFECTION WITHOUT HEMATURIA, SITE UNSPECIFIED: Primary | ICD-10-CM

## 2018-08-02 LAB — BACTERIA UR CULT: NORMAL

## 2018-08-06 ENCOUNTER — OFFICE VISIT (OUTPATIENT)
Dept: FAMILY MEDICINE CLINIC | Age: 83
End: 2018-08-06

## 2018-08-06 VITALS
RESPIRATION RATE: 15 BRPM | TEMPERATURE: 98.1 F | WEIGHT: 175 LBS | SYSTOLIC BLOOD PRESSURE: 139 MMHG | HEART RATE: 47 BPM | BODY MASS INDEX: 29.88 KG/M2 | DIASTOLIC BLOOD PRESSURE: 72 MMHG | HEIGHT: 64 IN | OXYGEN SATURATION: 98 %

## 2018-08-06 DIAGNOSIS — I49.5 SSS (SICK SINUS SYNDROME) (HCC): ICD-10-CM

## 2018-08-06 NOTE — MR AVS SNAPSHOT
Pop [facility] 
 
 
 [address] [facility] 
[address] [place] [address] 
[phone] Patient: Trent Duarte MRN:  [id number] Visit Information Date & Time Provider Department Dept. Phone Encounter #  
 8/6/2018  2:40 PM Marjan Smalls MD [facility] [phone] [id number] Upcoming Health Maintenance Date Due Influenza Age 5 to Adult 9/28/2018* MEDICARE YEARLY EXAM 2/14/2019 BREAST CANCER SCRN MAMMOGRAM 3/2/2019 GLAUCOMA SCREENING Q2Y 5/23/2019 DTaP/Tdap/Td series (2 - Td) 6/26/2027 *Topic was postponed. The date shown is not the original due date. Allergies as of 8/6/2018  Review Complete On: 8/6/2018 By: Ruthie Barajas Severity Noted Reaction Type Reactions Caffeine  03/31/2010    Other (comments)  
 insomnia Lisinopril-hydrochlorothiazide  03/27/2013    Rash Current Immunizations  Reviewed on 2/13/2018 Name Date Td 12/23/2016 Not reviewed this visit You Were Diagnosed With   
  
 Codes Comments SSS (sick sinus syndrome) (HCC)     ICD-10-CM: I49.5 ICD-9-CM: 427.81 Vitals BP Pulse Temp Resp Height(growth percentile) Weight(growth percentile) 139/72 (BP 1 Location: Right arm, BP Patient Position: Sitting) (!) 47 98.1 °F (36.7 °C) (Oral) 15 5' 4\" (1.626 m) 175 lb (79.4 kg) SpO2 BMI OB Status Smoking Status 98% 30.04 kg/m2 Postmenopausal Never Smoker Vitals History BMI and BSA Data Body Mass Index Body Surface Area 30.04 kg/m 2 1.89 m 2 Preferred Pharmacy Pharmacy Name Phone [id number], [address] [place] [address] [address] [phone] Your Updated Medication List  
  
   
This list is accurate as of 8/6/18  3:45 PM.  Always use your most recent med list. ALLEGRA 180 mg tablet Generic drug:  fexofenadine Take 180 mg by mouth daily. aspirin 325 mg tablet Commonly known as:  ASPIRIN Take 325 mg by mouth daily. losartan 50 mg tablet Commonly known as:  COZAAR Take 1 Tab by mouth daily. peg 400-propylene glycol 0.4-0.3 % Drop Commonly known as:  SYSTANE Administer 1 Drop to both eyes nightly. VITAMIN D3 PO Take 1 Tab by mouth daily. Introducing [facility]! Dear Cami Bennett: 
Thank you for requesting a "[employer]" account. Our records indicate that you have previously registered for a "[employer]" account but its currently inactive. Please call our "[employer]" support line at [phone]. Additional Information If you have questions, please visit the Frequently Asked Questions section of the "[employer]" website at [email or web address] Remember, "[employer]" is NOT to be used for urgent needs. For medical emergencies, dial 911. Now available from your iPhone and Android! Please provide this summary of care documentation to your next provider. Your primary care clinician is listed as [id number] If you have any questions after today's visit, please call [phone].

## 2018-08-06 NOTE — PROGRESS NOTES
Virgie Chawla  Identified pt with two pt identifiers(name and ). Chief Complaint   Patient presents with    Results    Shoulder Pain     right shoulder; sharp when it happens; comes and goes.  Leg Pain     right thigh area; sharp when it happens; comes and goes.  Urinary Frequency       1. Have you been to the ER, urgent care clinic since your last visit? Hospitalized since your last visit? No    2. Have you seen or consulted any other health care providers outside of the [address] [place] [address] since your last visit? Include any pap smears or colon screening. No    In the event something were to happen to you and you were unable to speak on your behalf, do you have an Advance Directive/ Living Will in place stating your wishes? NO    If yes, do we have a copy on file NO    If no, would you like information:          Medication reconciliation up to date and corrected with patient at this time. Today's provider has been notified of reason for visit, vitals and flowsheets obtained on patients. Reviewed record in preparation for visit, huddled with provider and have obtained necessary documentation. There are no preventive care reminders to display for this patient.     Wt Readings from Last 3 Encounters:   18 175 lb (79.4 kg)   18 176 lb 9.6 oz (80.1 kg)   18 181 lb (82.1 kg)     Temp Readings from Last 3 Encounters:   18 95.6 °F (35.3 °C) (Oral)   10/06/17 98.1 °F (36.7 °C)   10/06/17 98.4 °F (36.9 °C)     BP Readings from Last 3 Encounters:   18 159/72   18 144/70   10/06/17 133/75     Pulse Readings from Last 3 Encounters:   18 (!) 53   18 90   10/06/17 (!) 58     Vitals:    18 1522   BP: 139/72   Pulse: (!) 47   Resp: 15   Temp: 98.1 °F (36.7 °C)   TempSrc: Oral   SpO2: 98%   Weight: 175 lb (79.4 kg)   Height: 5' 4\" (1.626 m)   PainSc:   0 - No pain         Learning Assessment:  :     Learning Assessment 2018   PRIMARY LEARNER Patient   HIGHEST LEVEL OF EDUCATION - PRIMARY LEARNER  > 4 YEARS OF COLLEGE   BARRIERS PRIMARY LEARNER HEARING   CO-LEARNER CAREGIVER No   PRIMARY LANGUAGE ENGLISH   LEARNER PREFERENCE PRIMARY READING     DEMONSTRATION   ANSWERED BY Patient   RELATIONSHIP SELF       Depression Screening:  :     PHQ over the last two weeks 2/13/2018   Little interest or pleasure in doing things Not at all   Feeling down, depressed, irritable, or hopeless Not at all   Total Score PHQ 2 0       Fall Risk Assessment:  :     Fall Risk Assessment, last 12 mths 2/13/2018   Able to walk? Yes   Fall in past 12 months? Yes   Fall with injury? Yes   Number of falls in past 12 months 1   Fall Risk Score 2       Abuse Screening:  :     Abuse Screening Questionnaire 2/13/2018   Do you ever feel afraid of your partner? N   Are you in a relationship with someone who physically or mentally threatens you? N   Is it safe for you to go home?  Y       ADL Screening:  :     ADL Assessment 2/13/2018   Feeding yourself No Help Needed   Getting from bed to chair No Help Needed   Getting dressed No Help Needed   Bathing or showering No Help Needed   Walk across the room (includes cane/walker) No Help Needed   Using the telphone No Help Needed   Taking your medications No Help Needed   Preparing meals No Help Needed   Managing money (expenses/bills) No Help Needed   Moderately strenuous housework (laundry) No Help Needed   Shopping for personal items (toiletries/medicines) No Help Needed   Shopping for groceries No Help Needed   Driving No Help Needed   Climbing a flight of stairs No Help Needed   Getting to places beyond walking distances No Help Needed

## 2018-08-06 NOTE — ASSESSMENT & PLAN NOTE
Key CAD CHF Meds             losartan (COZAAR) 50 mg tablet  (Taking) Take 1 Tab by mouth daily. aspirin (ASPIRIN) 325 mg tablet  (Taking) Take 325 mg by mouth daily.         [id number](BNP,BNPP,BNPPPOC,HSCRP,NA,NAPOC,K,KPOCT,CHOL,CHOLPOCT,HDL,HDLPOC,LDLCHOL,LDLPOCT,LDLCPOC,LDLC,LDL,LDLCEXT,TRIGL,TGLPOCT,INR,INREXT,INRPOC,PTP,PTINR,PTEXT,DIG)@

## 2018-08-06 NOTE — PROGRESS NOTES
Right shoulder bothering 1-2 mos. Right thigh pain for 1-2 mos. Same sxs 1-2 yrs ago. Resolved on its own around 6 mos. Went to PT and did exercises. Defers PT now. Urine frequency past 4-6 mos. Defers meds nor specialist.  No UTI sxs except fatigued and urine incont. Not interested in taking meds for chol if doesn't feel bad. Sees card annually. Has self tx'd UTIs in past with OTC meds and cranberry juice. States never had this antibiotic before. Discussed need to be aware of different sxs of UTI for older people. 20 minutes spent in counseling patient about UTI sxs and treatment. Reviewed rest of recent lab results.

## 2019-01-08 ENCOUNTER — OFFICE VISIT (OUTPATIENT)
Dept: CARDIOLOGY CLINIC | Age: 84
End: 2019-01-08

## 2019-01-08 VITALS
SYSTOLIC BLOOD PRESSURE: 128 MMHG | RESPIRATION RATE: 17 BRPM | DIASTOLIC BLOOD PRESSURE: 78 MMHG | HEART RATE: 54 BPM | BODY MASS INDEX: 30.87 KG/M2 | OXYGEN SATURATION: 97 % | WEIGHT: 180.8 LBS | HEIGHT: 64 IN

## 2019-01-08 DIAGNOSIS — I44.0 HEART BLOCK AV FIRST DEGREE: ICD-10-CM

## 2019-01-08 DIAGNOSIS — I49.5 SSS (SICK SINUS SYNDROME) (HCC): Primary | ICD-10-CM

## 2019-01-08 DIAGNOSIS — I65.23 CAROTID ARTERY PLAQUE, BILATERAL: ICD-10-CM

## 2019-01-08 DIAGNOSIS — I10 ESSENTIAL HYPERTENSION: ICD-10-CM

## 2019-01-08 DIAGNOSIS — I69.398 CVA, OLD, DISTURBANCES OF VISION: ICD-10-CM

## 2019-01-08 DIAGNOSIS — I49.3 PVCS (PREMATURE VENTRICULAR CONTRACTIONS): ICD-10-CM

## 2019-01-08 DIAGNOSIS — H53.9 CVA, OLD, DISTURBANCES OF VISION: ICD-10-CM

## 2019-01-08 RX ORDER — LOSARTAN POTASSIUM 50 MG/1
50 TABLET ORAL DAILY
Qty: 90 TAB | Refills: 3 | Status: SHIPPED | OUTPATIENT
Start: 2019-01-08 | End: 2020-03-02 | Stop reason: SDUPTHER

## 2019-01-08 NOTE — PROGRESS NOTES
HISTORY OF PRESENT ILLNESS Mili Brown is a 80 y.o. female. She has sick sinus syndrome and resting bradycardia as well as first degree block. She also has controlled hypertension. She has had some issues with urinary frequency and urgency. Her blood pressure was low on two occasions when measured at [facility] going down to 324 systolic, however, she had no symptoms. It is 275 systolic today. HPI Visit Vitals /78 (BP 1 Location: Left arm, BP Patient Position: Sitting) Pulse (!) 54 Resp 17 Ht 5' 4\" (1.626 m) Wt 180 lb 12.8 oz (82 kg) SpO2 97% BMI 31.03 kg/m² Patient Active Problem List  
Diagnosis Code  Vitamin D deficiency E55.9  BP (high blood pressure) I10  
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 90 Tab 3  
 CHOLECALCIFEROL, VITAMIN D3, (VITAMIN D3 PO) Take 1 Tab by mouth daily.  peg 400-propylene glycol (SYSTANE) 0.4-0.3 % drop Administer 1 Drop to both eyes nightly.  fexofenadine (ALLEGRA) 180 mg tablet Take 180 mg by mouth daily.  aspirin (ASPIRIN) 325 mg tablet Take 325 mg by mouth daily. Past Medical History:  
Diagnosis Date  Advance directive discussed with patient 06/22/2016  Allergic rhinitis  Bone spur 09/2013 Rt shoulder, Dr. Gentry Dubin  Cerumen impaction 12/20/15 [facility] notes  Chronic pain   
 left hip pain in the am  
 Cystocele 10/20/14  
 with nocturia [facility] notes rec'd  DDD (degenerative disc disease)  Elevated lipids  Essential hypertension  Hematuria 10/2008 renal cyst found on ultrasound but acute cystitis [facility]  Jaw fracture ([address] 11/2017  Laceration of foot 12/23/2016  
 flap type-sutures not done plain Td given  Left sided lacunar infarction 12/28/15  
 found on CT scan of head-old issue  Macular degeneration VEI dr Jones Stain  OM (otitis media), acute 6/4/15 [facility] and 10/14/15 ENT Scot Copier  Prolapse of female bladder, acquired 2012 Was treated with PT  
 Screening for glaucoma 02/27/2017 & 5/23/17 [place] [facility]  Swelling of limb, right  Thyroid nodule [id number]  
 colloid nodule dr Sousa Comp  Tinnitus 1/6/16 Marcjulio Ripper -neck mass being monitored  Urinary urgency 2015          3/9/15 Physical Therapy thru [facility] rec'd  Vitamin D deficiency 10/2013 Past Surgical History:  
Procedure Laterality Date  BREAST SURGERY PROCEDURE UNLISTED  1980  
 cyst right breast  
 ECHOCARDIOGRAM  1/2004  
 mild LVH on echo  ENDOSCOPY, COLON, DIAGNOSTIC  2006  
 thru [facility]  HX CATARACT REMOVAL    
 left  HX CHOLECYSTECTOMY  HX GYN  2006 [facility]  
 HX HEENT  4/2010  
 right cataract  HX OTHER SURGICAL  1/2009  
 foreign body granuloma dr Juan Pablo Gunter  HX OTHER SURGICAL Left 2/1/16  
 carotid dopplers done/old [facility]va  HX UROLOGICAL  05/2015  
 pt states she has a prolapsed bladder but did not have any surgeries. she does kegel exercies Review of Systems Genitourinary: Positive for urgency. All other systems reviewed and are negative. Physical Exam  
Constitutional: She is oriented to person, place, and time. She appears well-nourished. HENT:  
Head: Atraumatic. Eyes: Conjunctivae are normal.  
Neck: Neck supple. Cardiovascular: Normal rate, regular rhythm and normal heart sounds. Exam reveals no gallop and no friction rub. No murmur heard. Pulmonary/Chest: Breath sounds normal. She has no wheezes. She has no rales. Abdominal: Bowel sounds are normal.  
Musculoskeletal: She exhibits no edema. Neurological: She is oriented to person, place, and time. Skin: Skin is dry. Psychiatric: Her behavior is normal.  
Nursing note and vitals reviewed. ASSESSMENT and PLAN Her heart rate to my exam is 60 beats per minute although she does have sick sinus syndrome and some conduction abnormalities, it is not worsening and she does not need a pacemaker. I will continue her current regimen and see her in one year.

## 2019-01-08 NOTE — PROGRESS NOTES
Chief Complaint Patient presents with  Annual Exam  
 
 
1. Have you been to the ER, urgent care clinic since your last visit? Hospitalized since your last visit? No 
 
2. Have you seen or consulted any other health care providers outside of the [address] [place] [address] since your last visit? Include any pap smears or colon screening. No 
 
3) Do you have an Advance Directive on file? no 
 
Patient is accompanied by self I have received verbal consent from Yohana Molina to discuss any/all medical information while they are present in the room.

## 2019-02-28 ENCOUNTER — OFFICE VISIT (OUTPATIENT)
Dept: FAMILY MEDICINE CLINIC | Age: 84
End: 2019-02-28

## 2019-02-28 VITALS
BODY MASS INDEX: 31.04 KG/M2 | OXYGEN SATURATION: 98 % | WEIGHT: 175.2 LBS | DIASTOLIC BLOOD PRESSURE: 66 MMHG | TEMPERATURE: 96.5 F | HEIGHT: 63 IN | HEART RATE: 56 BPM | RESPIRATION RATE: 20 BRPM | SYSTOLIC BLOOD PRESSURE: 135 MMHG

## 2019-02-28 DIAGNOSIS — N85.00 ENDOMETRIAL HYPERPLASIA: ICD-10-CM

## 2019-02-28 DIAGNOSIS — H35.30 MACULAR DEGENERATION (SENILE) OF RETINA: ICD-10-CM

## 2019-02-28 DIAGNOSIS — Z71.89 ADVANCED DIRECTIVES, COUNSELING/DISCUSSION: ICD-10-CM

## 2019-02-28 DIAGNOSIS — Z00.00 MEDICARE ANNUAL WELLNESS VISIT, SUBSEQUENT: Primary | ICD-10-CM

## 2019-02-28 DIAGNOSIS — N81.10 FEMALE BLADDER PROLAPSE: ICD-10-CM

## 2019-02-28 PROBLEM — H26.493 PCO (POSTERIOR CAPSULAR OPACIFICATION), BILATERAL: Status: ACTIVE | Noted: 2018-04-29

## 2019-02-28 RX ORDER — TROSPIUM CHLORIDE 20 MG/1
TABLET, FILM COATED ORAL
Refills: 3 | COMMUNITY
Start: 2018-12-18 | End: 2019-07-31 | Stop reason: ALTCHOICE

## 2019-02-28 NOTE — PROGRESS NOTES
This is the Subsequent Medicare Annual Wellness Exam, performed 12 months or more after the Initial AWV or the last Subsequent AWV I have reviewed the patient's medical history in detail and updated the computerized patient record. Eye doctor q 3-4 mos. Dentist 2 x annually. Colonoscopy '75. Done. Mammo annually. Card annually. ENT q 4 mos for ear scraping. Right arm swollen. No regular exercise. Urol for [facility] for endomet hyperplasia. No signif hx past yr. History Past Medical History:  
Diagnosis Date  Advance directive discussed with patient 06/22/2016  Allergic rhinitis  Bone spur 09/2013 Rt shoulder, Dr. Sis Garnerumechela impaction 12/20/15 [place] [facility] notes  Chronic pain   
 left hip pain in the am  
 Cystocele 10/20/14  
 with nocturia [facility]  DDD (degenerative disc disease)  Elevated lipids  Essential hypertension  Hematuria 10/2008 renal cyst found on ultrasound but acute cystitis [facility]  Jaw fracture ([address] 11/2017  Laceration of foot 12/23/2016  
 flap type-sutures not done plain Td given  Left sided lacunar infarction 12/28/15  
 found on CT scan of head-old issue  Macular degeneration VEI dr Booker Snantonio  OM (otitis media), acute 6/4/15 [facility] and 10/14/15 ENT Argelia Westbrook  Prolapse of female bladder, acquired 2012 Was treated with PT  
 Screening for glaucoma 02/27/2017 & 5/23/17 [place] [facility]  Swelling of limb, right  Thyroid nodule [id number]  
 colloid nodule dr Sheila Ledesma  Tinnitus 1/6/16 Louretta Flirt -neck mass being monitored  Urinary urgency 2015          3/9/15 Physical Therapy [facility]  Vitamin D deficiency 10/2013 Past Surgical History:  
Procedure Laterality Date  BREAST SURGERY PROCEDURE UNLISTED  1980  
 cyst right breast  
 ECHOCARDIOGRAM  1/2004  
 mild LVH on echo  ENDOSCOPY, COLON, DIAGNOSTIC  2006  
 thru [facility]  
  HX CATARACT REMOVAL    
 left  HX CHOLECYSTECTOMY  HX GYN  2006 [facility]  
 HX HEENT  4/2010  
 right cataract  HX OTHER SURGICAL  1/2009  
 foreign body granuloma dr Dayana Foy  HX OTHER SURGICAL Left 2/1/16  
 carotid dopplers done/old cva  HX UROLOGICAL  05/2015  
 pt states she has a prolapsed bladder but did not have any surgeries. she does kegel exercies Current Outpatient Medications Medication Sig Dispense Refill  trospium (SANCTURA) 20 mg tablet TAKE ONE TABLET BY MOUTH TWICE DAILY  3  
 losartan (COZAAR) 50 mg tablet Take 1 Tab by mouth daily. 90 Tab 3  
 CHOLECALCIFEROL, VITAMIN D3, (VITAMIN D3 PO) Take 1 Tab by mouth daily.  peg 400-propylene glycol (SYSTANE) 0.4-0.3 % drop Administer 1 Drop to both eyes nightly.  fexofenadine (ALLEGRA) 180 mg tablet Take 180 mg by mouth daily. Allergies Allergen Reactions  Caffeine Other (comments)  
  insomnia  Lisinopril-Hydrochlorothiazide Rash Family History Problem Relation Age of Onset  Cancer Sister   
     lung  Hypertension Sister  Stroke Brother  Heart Disease Brother  Cancer Sister   
     breast  
 Cancer Father   
     brain tumor Social History Tobacco Use  Smoking status: Never Smoker  Smokeless tobacco: Never Used Substance Use Topics  Alcohol use: Yes Comment: social  
 
Patient Active Problem List  
Diagnosis Code  Vitamin D deficiency E55.9  BP (high blood pressure) I10  
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  
 
3 most recent PHQ Screens 2/28/2019 Little interest or pleasure in doing things Not at all Feeling down, depressed, irritable, or hopeless Not at all Total Score PHQ 2 0 Alcohol Risk Factor Screening: You do not drink alcohol or very rarely. Functional Ability and Level of Safety:  
Hearing Loss The patient needs further evaluation. Activities of Daily Living The home contains: handrails and rugs Patient does total self care Fall Risk Fall Risk Assessment, last 12 mths 2/28/2019 Able to walk? Yes Fall in past 12 months? Yes Fall with injury? Yes  
Number of falls in past 12 months 1 Fall Risk Score 2 Abuse Screen Patient is not abused Cognitive Screening Evaluation of Cognitive Function: 
Has your family/caregiver stated any concerns about your memory: yes Normal 
 
Patient Care Team  
Patient Care Team: 
Caroline Cisneros MD as PCP - General 
Venancio Cary MD (Endocrinology) Ronnie Ledesma MD (Ophthalmology) Julio Pan MD (Cardiology) Negar Dickens MD (Otolaryngology) Marilin Maldonado RN as Ambulatory Care Navigator [facility]) Ralph Lorenzo MD (Ophthalmology) Assessment/Plan Education and counseling provided: 
Are appropriate based on today's review and evaluation End-of-Life planning (with patient's consent) Screening Mammography Cardiovascular screening blood test 
Screening for glaucoma Diabetes screening test 
 
Diagnoses and all orders for this visit: 
 
1. Medicare annual wellness visit, subsequent 2. Advanced directives, counseling/discussion 3. Endometrial hyperplasia 4. Female bladder prolapse 5. Macular degeneration (senile) of retina Health Maintenance Due Topic Date Due  MEDICARE YEARLY EXAM  02/14/2019

## 2019-02-28 NOTE — ACP (ADVANCE CARE PLANNING)
Advance Care Planning    Advance Care Planning (ACP) Provider Conversation Snapshot    Date of ACP Conversation: 02/28/19  Persons included in Conversation:  patient  Length of ACP Conversation in minutes:  <16 minutes (Non-Billable)    Authorized Decision Maker (if patient is incapable of making informed decisions): This person is:    Other Legally Authorized Decision Maker (e.g. Next of Kin)  Daughter-Roberta        For Patients with Decision Making Capacity:   Values/Goals: Exploration of values, goals, and preferences if recovery is not expected, even with continued medical treatment in the event of:  Imminent death  Severe, permanent brain injury    Conversation Outcomes / Follow-Up Plan:   Recommended completion of Advance Directive form after review of ACP materials and conversation with prospective healthcare agent

## 2019-02-28 NOTE — PATIENT INSTRUCTIONS
Medicare Wellness Visit, Female The best way to live healthy is to have a lifestyle where you eat a well-balanced diet, exercise regularly, limit alcohol use, and quit all forms of tobacco/nicotine, if applicable. Regular preventive services are another way to keep healthy. Preventive services (vaccines, screening tests, monitoring & exams) can help personalize your care plan, which helps you manage your own care. Screening tests can find health problems at the earliest stages, when they are easiest to treat. [facility] follows the current, evidence-based guidelines published by the [facility] ([facility]) when recommending preventive services for our patients. Because we follow these guidelines, sometimes recommendations change over time as research supports it. (For example, mammograms used to be recommended annually. Even though Medicare will still pay for an annual mammogram, the newer guidelines recommend a mammogram every two years for women of average risk.) Of course, you and your doctor may decide to screen more often for some diseases, based on your risk and your health status. Preventive services for you include: - Medicare offers their members a free annual wellness visit, which is time for you and your primary care provider to discuss and plan for your preventive service needs. Take advantage of this benefit every year! 
-All adults over the age of 72 should receive the recommended pneumonia vaccines. Current USPSTF guidelines recommend a series of two vaccines for the best pneumonia protection.  
-All adults should have a flu vaccine yearly and a tetanus vaccine every 10 years. All adults age 61 and older should receive a shingles vaccine once in their lifetime.   
-A bone mass density test is recommended when a woman turns 65 to screen for osteoporosis. This test is only recommended one time, as a screening. Some providers will use this same test as a disease monitoring tool if you already have osteoporosis. -All adults age 38-68 who are overweight should have a diabetes screening test once every three years.  
-Other screening tests and preventive services for persons with diabetes include: an eye exam to screen for diabetic retinopathy, a kidney function test, a foot exam, and stricter control over your cholesterol.  
-Cardiovascular screening for adults with routine risk involves an electrocardiogram (ECG) at intervals determined by your doctor.  
-Colorectal cancer screenings should be done for adults age 54-65 with no increased risk factors for colorectal cancer. There are a number of acceptable methods of screening for this type of cancer. Each test has its own benefits and drawbacks. Discuss with your doctor what is most appropriate for you during your annual wellness visit. The different tests include: colonoscopy (considered the best screening method), a fecal occult blood test, a fecal DNA test, and sigmoidoscopy. -Breast cancer screenings are recommended every other year for women of normal risk, age 54-69. 
-Cervical cancer screenings for women over age 72 are only recommended with certain risk factors.  
-All adults born between [facility] should be screened once for Hepatitis C. Here is a list of your current Health Maintenance items (your personalized list of preventive services) with a due date: 
Health Maintenance Due Topic Date Due  
 Annual Well Visit  02/14/2019

## 2019-03-18 ENCOUNTER — HOSPITAL ENCOUNTER (OUTPATIENT)
Dept: LAB | Age: 84
Discharge: HOME | End: 2019-03-18

## 2019-06-11 ENCOUNTER — OFFICE VISIT (OUTPATIENT)
Dept: FAMILY MEDICINE CLINIC | Age: 84
End: 2019-06-11

## 2019-06-11 VITALS
BODY MASS INDEX: 29.74 KG/M2 | HEART RATE: 56 BPM | WEIGHT: 178.5 LBS | RESPIRATION RATE: 22 BRPM | HEIGHT: 65 IN | TEMPERATURE: 95.9 F | SYSTOLIC BLOOD PRESSURE: 138 MMHG | DIASTOLIC BLOOD PRESSURE: 76 MMHG | OXYGEN SATURATION: 95 %

## 2019-06-11 DIAGNOSIS — N81.10 FEMALE BLADDER PROLAPSE: Primary | ICD-10-CM

## 2019-06-11 DIAGNOSIS — N39.0 FREQUENT UTI: ICD-10-CM

## 2019-06-11 DIAGNOSIS — I10 ESSENTIAL HYPERTENSION: ICD-10-CM

## 2019-06-11 DIAGNOSIS — E55.9 VITAMIN D DEFICIENCY: ICD-10-CM

## 2019-06-11 RX ORDER — TROSPIUM CHLORIDE ER 60 MG/1
CAPSULE ORAL
Refills: 3 | COMMUNITY
Start: 2019-03-27 | End: 2019-06-11 | Stop reason: ALTCHOICE

## 2019-06-11 NOTE — PROGRESS NOTES
Virgie Chawla  Identified pt with two pt identifiers(name and ). Chief Complaint   Patient presents with    Shoulder Pain     right shoulder    Medication Evaluation     Imvexxy/Estradial       1. Have you been to the ER, urgent care clinic since your last visit? Hospitalized since your last visit? No    2. Have you seen or consulted any other health care providers outside of the [address] [place] [address] since your last visit? Include any pap smears or colon screening. Dr. Giancarlo Saul      Would you like to sign up for MyChart today, if you have not already done so? Patient has a mychart  If not, would you like information on MyChart, and how to sign up at a later time? No      Medication reconciliation up to date and corrected with patient at this time. Today's provider has been notified of reason for visit, vitals and flowsheets obtained on patients. Reviewed record in preparation for visit, huddled with provider and have obtained necessary documentation.       Health Maintenance Due   Topic    Pneumococcal 65+ years (1 of 2 - PCV13)    GLAUCOMA SCREENING Q2Y        Wt Readings from Last 3 Encounters:   19 178 lb 8 oz (81 kg)   19 175 lb 3.2 oz (79.5 kg)   19 180 lb 12.8 oz (82 kg)     Temp Readings from Last 3 Encounters:   19 95.9 °F (35.5 °C) (Oral)   19 96.5 °F (35.8 °C) (Oral)   18 98.1 °F (36.7 °C) (Oral)     BP Readings from Last 3 Encounters:   19 153/77   19 135/66   19 128/78     Pulse Readings from Last 3 Encounters:   19 (!) 56   19 (!) 56   19 (!) 54     Vitals:    19 0944   BP: 153/77   Pulse: (!) 56   Resp: 22   Temp: 95.9 °F (35.5 °C)   TempSrc: Oral   SpO2: 95%   Weight: 178 lb 8 oz (81 kg)   Height: 5' 4.57\" (1.64 m)   PainSc:   0 - No pain         Learning Assessment:  :     Learning Assessment 2019   PRIMARY LEARNER Patient Patient   HIGHEST LEVEL OF EDUCATION - PRIMARY LEARNER  - > 4 YEARS OF COLLEGE   BARRIERS PRIMARY LEARNER - HEARING   CO-LEARNER CAREGIVER - No   PRIMARY LANGUAGE ENGLISH ENGLISH   LEARNER PREFERENCE PRIMARY DEMONSTRATION READING     - DEMONSTRATION   ANSWERED BY SELF Patient   RELATIONSHIP SELF SELF       Depression Screening:  :     3 most recent PHQ Screens 2/28/2019   Little interest or pleasure in doing things Not at all   Feeling down, depressed, irritable, or hopeless Not at all   Total Score PHQ 2 0       Fall Risk Assessment:  :     Fall Risk Assessment, last 12 mths 2/28/2019   Able to walk? Yes   Fall in past 12 months? Yes   Fall with injury? Yes   Number of falls in past 12 months 1   Fall Risk Score 2       Abuse Screening:  :     Abuse Screening Questionnaire 2/28/2019 2/13/2018   Do you ever feel afraid of your partner? N N   Are you in a relationship with someone who physically or mentally threatens you? N N   Is it safe for you to go home?  Y Y       ADL Screening:  :     ADL Assessment 2/28/2019   Feeding yourself No Help Needed   Getting from bed to chair No Help Needed   Getting dressed No Help Needed   Bathing or showering No Help Needed   Walk across the room (includes cane/walker) No Help Needed   Using the telphone No Help Needed   Taking your medications No Help Needed   Preparing meals No Help Needed   Managing money (expenses/bills) No Help Needed   Moderately strenuous housework (laundry) No Help Needed   Shopping for personal items (toiletries/medicines) No Help Needed   Shopping for groceries No Help Needed   Driving No Help Needed   Climbing a flight of stairs No Help Needed   Getting to places beyond walking distances No Help Needed

## 2019-06-11 NOTE — PROGRESS NOTES
Pt saw urol for bladder prolapse. Saw urol first 3 yrs ago. Sent to PT for 2 mos. Doing Kegel's. Since PT doing Kegel's. Went back as hadn't seen urol for awhile and new urol but not happy with her. Saw her month ago. Had operation for endometrial hyperplasia 1-2 mos ago. Had had that same surgery 15-20 yrs ago. After surgery recommended to start the estradiol. States provider never discussed with patient just got a call from the company to send med through the mail. Last UTI 2 mos ago. Visit Vitals  /76 (BP 1 Location: Right arm, BP Patient Position: Sitting)   Pulse (!) 56   Temp 95.9 °F (35.5 °C) (Oral)   Resp 22   Ht 5' 4.57\" (1.64 m)   Wt 178 lb 8 oz (81 kg)   SpO2 95%   BMI 30.10 kg/m²       Patient alert and cooperative. Reviewed above. Assessment:  1. Female bladder prolapse with frequent UTIs, apparent endometrial hyperplasia, recently retreated. Plan:  1. Referred to female urologist for ongoing care, is not happy with current provider. 2. Get annual labs today. 3. Return in eight months for wellness. 4. Follow otherwise here prn. TOTAL FACE TO FACE TIME OF 15 MINUTES SPENT WITH PATIENT. GREATER THAN 50% OF TIME WAS SPENT IN COUNSELING AND/OR COORDINATION OF CARE. SEE HPI, ASSESSMENT AND PLAN FOR DETAILS.

## 2019-06-12 LAB
25(OH)D3+25(OH)D2 SERPL-MCNC: 40.4 NG/ML (ref 30–100)
ALBUMIN SERPL-MCNC: 3.9 G/DL (ref 3.5–4.7)
ALBUMIN/GLOB SERPL: 1.5 {RATIO} (ref 1.2–2.2)
ALP SERPL-CCNC: 73 IU/L (ref 39–117)
ALT SERPL-CCNC: 9 IU/L (ref 0–32)
AST SERPL-CCNC: 15 IU/L (ref 0–40)
BASOPHILS # BLD AUTO: 0 X10E3/UL (ref 0–0.2)
BASOPHILS NFR BLD AUTO: 1 %
BILIRUB SERPL-MCNC: 0.3 MG/DL (ref 0–1.2)
BUN SERPL-MCNC: 18 MG/DL (ref 8–27)
BUN/CREAT SERPL: 32 (ref 12–28)
CALCIUM SERPL-MCNC: 9.3 MG/DL (ref 8.7–10.3)
CHLORIDE SERPL-SCNC: 105 MMOL/L (ref 96–106)
CHOLEST SERPL-MCNC: 251 MG/DL (ref 100–199)
CO2 SERPL-SCNC: 27 MMOL/L (ref 20–29)
CREAT SERPL-MCNC: 0.56 MG/DL (ref 0.57–1)
EOSINOPHIL # BLD AUTO: 0.1 X10E3/UL (ref 0–0.4)
EOSINOPHIL NFR BLD AUTO: 1 %
ERYTHROCYTE [DISTWIDTH] IN BLOOD BY AUTOMATED COUNT: 15.7 % (ref 12.3–15.4)
GLOBULIN SER CALC-MCNC: 2.6 G/DL (ref 1.5–4.5)
GLUCOSE SERPL-MCNC: 87 MG/DL (ref 65–99)
HCT VFR BLD AUTO: 37.9 % (ref 34–46.6)
HDLC SERPL-MCNC: 63 MG/DL
HGB BLD-MCNC: 12.5 G/DL (ref 11.1–15.9)
IMM GRANULOCYTES # BLD AUTO: 0 X10E3/UL (ref 0–0.1)
IMM GRANULOCYTES NFR BLD AUTO: 1 %
INTERPRETATION, 910389: NORMAL
LDLC SERPL CALC-MCNC: 176 MG/DL (ref 0–99)
LYMPHOCYTES # BLD AUTO: 1.9 X10E3/UL (ref 0.7–3.1)
LYMPHOCYTES NFR BLD AUTO: 44 %
MCH RBC QN AUTO: 25.8 PG (ref 26.6–33)
MCHC RBC AUTO-ENTMCNC: 33 G/DL (ref 31.5–35.7)
MCV RBC AUTO: 78 FL (ref 79–97)
MONOCYTES # BLD AUTO: 0.4 X10E3/UL (ref 0.1–0.9)
MONOCYTES NFR BLD AUTO: 9 %
NEUTROPHILS # BLD AUTO: 1.8 X10E3/UL (ref 1.4–7)
NEUTROPHILS NFR BLD AUTO: 44 %
PLATELET # BLD AUTO: 197 X10E3/UL (ref 150–450)
POTASSIUM SERPL-SCNC: 4.3 MMOL/L (ref 3.5–5.2)
PROT SERPL-MCNC: 6.5 G/DL (ref 6–8.5)
RBC # BLD AUTO: 4.85 X10E6/UL (ref 3.77–5.28)
SODIUM SERPL-SCNC: 145 MMOL/L (ref 134–144)
TRIGL SERPL-MCNC: 58 MG/DL (ref 0–149)
TSH SERPL DL<=0.005 MIU/L-ACNC: 2.74 UIU/ML (ref 0.45–4.5)
VLDLC SERPL CALC-MCNC: 12 MG/DL (ref 5–40)
WBC # BLD AUTO: 4.2 X10E3/UL (ref 3.4–10.8)

## 2019-06-25 LAB
APPEARANCE UR: CLEAR
BACTERIA #/AREA URNS HPF: NORMAL /[HPF]
BILIRUB UR QL STRIP: NEGATIVE
CASTS URNS QL MICRO: NORMAL /LPF
COLOR UR: YELLOW
EPI CELLS #/AREA URNS HPF: NORMAL /HPF (ref 0–10)
GLUCOSE UR QL: NEGATIVE
HGB UR QL STRIP: NEGATIVE
KETONES UR QL STRIP: NEGATIVE
LEUKOCYTE ESTERASE UR QL STRIP: NEGATIVE
MICRO URNS: NORMAL
MICRO URNS: NORMAL
MUCOUS THREADS URNS QL MICRO: PRESENT
NITRITE UR QL STRIP: NEGATIVE
PH UR STRIP: 7 [PH] (ref 5–7.5)
PROT UR QL STRIP: NEGATIVE
RBC #/AREA URNS HPF: NORMAL /HPF (ref 0–2)
SP GR UR: 1.01 (ref 1–1.03)
URINALYSIS REFLEX, 377202: NORMAL
UROBILINOGEN UR STRIP-MCNC: 0.2 MG/DL (ref 0.2–1)
WBC #/AREA URNS HPF: NORMAL /HPF (ref 0–5)

## 2019-07-31 ENCOUNTER — OFFICE VISIT (OUTPATIENT)
Dept: FAMILY MEDICINE CLINIC | Age: 84
End: 2019-07-31

## 2019-07-31 VITALS
DIASTOLIC BLOOD PRESSURE: 65 MMHG | SYSTOLIC BLOOD PRESSURE: 128 MMHG | TEMPERATURE: 97.5 F | HEART RATE: 53 BPM | BODY MASS INDEX: 29.31 KG/M2 | RESPIRATION RATE: 18 BRPM | HEIGHT: 65 IN | WEIGHT: 175.9 LBS | OXYGEN SATURATION: 95 %

## 2019-07-31 DIAGNOSIS — M54.50 ACUTE MIDLINE LOW BACK PAIN WITHOUT SCIATICA: ICD-10-CM

## 2019-07-31 DIAGNOSIS — M25.511 ACUTE PAIN OF RIGHT SHOULDER: Primary | ICD-10-CM

## 2019-07-31 RX ORDER — TROSPIUM CHLORIDE ER 60 MG/1
CAPSULE ORAL
Refills: 10 | COMMUNITY
Start: 2019-06-19

## 2019-07-31 NOTE — PROGRESS NOTES
Virgie Chawla  Identified pt with two pt identifiers(name and ). Chief Complaint   Patient presents with    Leg Pain    LOW BACK PAIN    Side Pain     left side; occurs when she has to go to the bathroom; pain will go away once she has voided.  Arm Pain       1. Have you been to the ER, urgent care clinic since your last visit? Hospitalized since your last visit? No    2. Have you seen or consulted any other health care providers outside of the [address] [place] [address] since your last visit? Include any pap smears or colon screening. Urologist      Would you like to sign up for MyChart today, if you have not already done so? Patient has a mychart  If not, would you like information on MyChart, and how to sign up at a later time? No      Medication reconciliation up to date and corrected with patient at this time. Today's provider has been notified of reason for visit, vitals and flowsheets obtained on patients. Reviewed record in preparation for visit, huddled with provider and have obtained necessary documentation.       Health Maintenance Due   Topic    Pneumococcal 65+ years (1 of 2 - PCV13)    GLAUCOMA SCREENING Q2Y        Wt Readings from Last 3 Encounters:   19 175 lb 14.4 oz (79.8 kg)   19 178 lb 8 oz (81 kg)   19 175 lb 3.2 oz (79.5 kg)     Temp Readings from Last 3 Encounters:   19 97.5 °F (36.4 °C) (Oral)   19 95.9 °F (35.5 °C) (Oral)   19 96.5 °F (35.8 °C) (Oral)     BP Readings from Last 3 Encounters:   19 128/65   19 138/76   19 135/66     Pulse Readings from Last 3 Encounters:   19 (!) 53   19 (!) 56   19 (!) 56     Vitals:    19 1607   BP: 128/65   Pulse: (!) 53   Resp: 18   Temp: 97.5 °F (36.4 °C)   TempSrc: Oral   SpO2: 95%   Weight: 175 lb 14.4 oz (79.8 kg)   Height: 5' 4.57\" (1.64 m)   PainSc:   0 - No pain         Learning Assessment:  :     Learning Assessment 2019   PRIMARY LEARNER Patient Patient   HIGHEST LEVEL OF EDUCATION - PRIMARY LEARNER  - > 4 YEARS OF COLLEGE   BARRIERS PRIMARY LEARNER - HEARING   CO-LEARNER CAREGIVER - No   PRIMARY LANGUAGE ENGLISH ENGLISH   LEARNER PREFERENCE PRIMARY DEMONSTRATION READING     - DEMONSTRATION   ANSWERED BY SELF Patient   RELATIONSHIP SELF SELF       Depression Screening:  :     3 most recent PHQ Screens 2/28/2019   Little interest or pleasure in doing things Not at all   Feeling down, depressed, irritable, or hopeless Not at all   Total Score PHQ 2 0       Fall Risk Assessment:  :     Fall Risk Assessment, last 12 mths 2/28/2019   Able to walk? Yes   Fall in past 12 months? Yes   Fall with injury? Yes   Number of falls in past 12 months 1   Fall Risk Score 2       Abuse Screening:  :     Abuse Screening Questionnaire 2/28/2019 2/13/2018   Do you ever feel afraid of your partner? N N   Are you in a relationship with someone who physically or mentally threatens you? N N   Is it safe for you to go home?  Y Y       ADL Screening:  :     ADL Assessment 2/28/2019   Feeding yourself No Help Needed   Getting from bed to chair No Help Needed   Getting dressed No Help Needed   Bathing or showering No Help Needed   Walk across the room (includes cane/walker) No Help Needed   Using the telphone No Help Needed   Taking your medications No Help Needed   Preparing meals No Help Needed   Managing money (expenses/bills) No Help Needed   Moderately strenuous housework (laundry) No Help Needed   Shopping for personal items (toiletries/medicines) No Help Needed   Shopping for groceries No Help Needed   Driving No Help Needed   Climbing a flight of stairs No Help Needed   Getting to places beyond walking distances No Help Needed

## 2019-07-31 NOTE — PROGRESS NOTES
New left side and back pain. Occurs at night. Resolves after voiding. Denies other bladder sxs. Takes med for urgency. Has name of urologist to see. Right arm swelling for years. Painful past 3 mos in shoulder. Sxs reaching over head. Low back sxs past 3 mos. Relieved with Advil as needed. Sleeps late. Feels tired and gets more easily fatigued. Stopped walking. Patient denies chest pain, dyspnea, unexpected weight change, unexpected pain, mood or memory changes. Visit Vitals  /65 (BP 1 Location: Left arm, BP Patient Position: Sitting)   Pulse (!) 53   Temp 97.5 °F (36.4 °C) (Oral)   Resp 18   Ht 5' 4.57\" (1.64 m)   Wt 175 lb 14.4 oz (79.8 kg)   LMP  (LMP Unknown)   SpO2 95%   BMI 29.66 kg/m²     Patient alert and cooperative. Reviewed above. Assessment:  1. Right shoulder pain, relatively new. 2. Chronic right arm lymphedema. 3. Relatively new lower back symptoms and more easily fatigued. Plan:  1. Advised to get out and start walking again when weather is cooler as will help the back and also increase exercise tolerance. 2. Will set up PT for new right shoulder symptoms. 3. Follow otherwise here prn.

## 2019-08-08 ENCOUNTER — HOSPITAL ENCOUNTER (OUTPATIENT)
Dept: PHYSICAL THERAPY | Age: 84
Discharge: HOME | End: 2019-08-08
Payer: MEDICARE

## 2019-08-08 PROCEDURE — 97110 THERAPEUTIC EXERCISES: CPT | Performed by: PHYSICAL THERAPIST

## 2019-08-08 PROCEDURE — 97162 PT EVAL MOD COMPLEX 30 MIN: CPT | Performed by: PHYSICAL THERAPIST

## 2019-08-08 NOTE — PROGRESS NOTES
PT INITIAL EVALUATION NOTE - [facility] 2-15    Patient Name: Hermilo Gardner  Date:2019  : 12/15/1932  [x]  Patient  Verified  Payor: [place] MEDICARE / Plan: VA MEDICARE PART A & B / Product Type: Medicare /    In time: 1:20pm  Out time: 2:20pm  Total Treatment Time (min): 60  Total Timed Codes (min): 25  1:1 Treatment Time ( [address] only): 25   Visit #: 1     Treatment Area: Right shoulder pain [M25.511]    SUBJECTIVE  Pain Level (0-10 scale): 0 (0-10/10)  Any medication changes, allergies to medications, adverse drug reactions, diagnosis change, or new procedure performed?: [] No    [x] Yes (see summary sheet for update)  Subjective: The patient reports that she had done therapy for her right shoulder about 4-5 years ago. She was told she has some lymph issues in her right arm (her hand is always swollen). She used to wear compression sleeves but it didn't help much. It hurts when she has to reach overhead, especially to get dressed. Everything felt better except the swelling, then about 3 months ago her right shoulder starting hurting again. It doesn't wake her up, but she sleeps on her right side every night. She did have a fall about 3 weeks ago when she slipped at [employer]. She had another fall about a year ago when she tripped over a bike wheel and broke her jaw. OBJECTIVE/EXAMINATION  Posture:  Scapular protraction bilaterally (right worse than left)  Other Observations:   Forward head  Palpation: tender along posterior shoulder    A/PROM Shoulder:   Right   Left   Flexion   140, p!/160  150/160   Extension  nt/nt   nt/nt   Abduction  90, p!/nt  150/nt   Adduction  nt/nt   nt/nt   IR   Hand to t7/90  t3/nt   ER   nt/90   nt/90    Joint Mobility Assessment: Glenohumeral: hypomobile    Flexibility: tight right pec    UPPER QUARTER     MUSCLE STRENGTH  KEY        R  L  0 - No Contraction   Flexion   4-, p!  4  1 - Trace    Extension (elbow) 4  4  2 - Poor    Abduction  4-, p!  4  3 - Fair     IR   4, p!  4  4 - Good    ER   3+, p!  4-  5 - Normal       Neurological: Reflexes / Sensations: nt  Special Tests: Neer Impingement: +  Brenda-Charanjit: +      25 min Therapeutic Exercise:  [x] See flow sheet :   Rationale: increase ROM, increase strength and improve coordination to improve the patients ability to perform daily activities.            With   [x] TE   [] TA   [] neuro   [] other: Patient Education: [x] Review HEP    [x] Progressed/Changed HEP based on:   [x] positioning   [x] body mechanics   [] transfers   [] heat/ice application    [] other:      Other Objective/Functional Measures: FOTO= 60    Pain Level (0-10 scale) post treatment: 0    ASSESSMENT/Changes in Function:     [x]  See Plan of [address], PT 8/8/2019

## 2019-08-08 NOTE — PROGRESS NOTES
[address] [address] (MOB IV), [address] [facility] [place]  Phone: [phone] Fax: [phone]     Plan of Care/Statement of Necessity for Physical Therapy Services  2-15    Patient name: Ridge Donahue  : 12/15/1932  Provider#: [id number]  Referral source: Garrick Pitt MD      Medical/Treatment Diagnosis: Right shoulder pain [M25.511]     Prior Hospitalization: see medical history     Comorbidities: back pain, BMI over 30, HTN, incontinence, kidney problems, visual impairment  Prior Level of Function: 20 min of exercise at least 3x/wk  Medications: Verified on Patient Summary List  Start of Care: 19      Onset Date: chronic   The Plan of Care and following information is based on the information from the initial evaluation. Assessment/ key information: The patient presents with a chief complaint of right shoulder pain that started approximately 3 months ago, however she's had a history of shoulder pain 4-5 years ago. She also reports chronic right upper extremity edema, and reports that some physicians have told her she has a \"lymphatic issue. \" The patient has noticeably decreased shoulder external rotation and flexion strength, indicating probable rotator cuff pathology with underlying degenerative changes. This is exacerbated by poor posture and decreased periscapular strength. The patient will benefit from guided therapeutic interventions such as therex for strengthening and neuromuscular re-eduction, manual techniques for joint mobility and soft tissue extensibility, and modalities for pain management in order to improve functional mobility with daily activities.     Evaluation Complexity History MEDIUM  Complexity : 1-2 comorbidities / personal factors will impact the outcome/ POC ; Examination MEDIUM Complexity : 3 Standardized tests and measures addressing body structure, function, activity limitation and / or participation in recreation ;Presentation MEDIUM Complexity : Evolving with changing characteristics  ; Clinical Decision Making MEDIUM Complexity : FOTO score of 26-74  Overall Complexity Rating: MEDIUM    Problem List: pain affecting function, decrease ROM, decrease strength, edema affecting function, impaired gait/ balance, decrease ADL/ functional abilitiies, decrease activity tolerance, decrease flexibility/ joint mobility and decrease transfer abilities   Treatment Plan may include any combination of the following: Therapeutic exercise, Therapeutic activities, Neuromuscular re-education, Physical agent/modality, Gait/balance training, Manual therapy, Patient education, Self Care training, Functional mobility training and Home safety training  Patient / Family readiness to learn indicated by: asking questions, trying to perform skills and interest  Persons(s) to be included in education: patient (P) and family support person (FSP);list granddaughter  Barriers to Learning/Limitations: None  Patient Goal (s): relief  Patient Self Reported Health Status: good  Rehabilitation Potential: good    Short Term Goals: To be accomplished in 2 treatments:                         1.) The patient will be independent with their HEP consistently for at least one week. Long Term Goals: To be accomplished in 16 treatments:                         1.) The patient will have at most 2/10 pain with daily activities. 2.) The patient will improve her right shoulder flexion to at least 150 degrees to assist with daily activities. 3.) The patient will improve their FOTO score from 60 to at least 62 to show improvements in functional mobility. Frequency / Duration: Patient to be seen 2 times per week for 16 treatments.       Patient/ Caregiver education and instruction: self care, activity modification and exercises    [x]  Plan of care has been reviewed with PTA        Certification Period: 8/8/19-11/8/19  Branden Jimenez Arabella, PT 8/8/2019     ________________________________________________________________________    I certify that the above Therapy Services are being furnished while the patient is under my care. I agree with the treatment plan and certify that this therapy is necessary.     [de-identified] Signature:____________________  Date:____________Time: _________

## 2019-08-14 ENCOUNTER — HOSPITAL ENCOUNTER (OUTPATIENT)
Dept: PHYSICAL THERAPY | Age: 84
Discharge: HOME | End: 2019-08-14
Payer: MEDICARE

## 2019-08-14 PROCEDURE — 97110 THERAPEUTIC EXERCISES: CPT | Performed by: PHYSICAL THERAPIST

## 2019-08-14 NOTE — PROGRESS NOTES
PT DAILY TREATMENT NOTE - [facility] 2-15    Patient Name: Ryan Murillo  Date:2019  : 12/15/1932  [x]  Patient  Verified  Payor: Amy Pan / Plan: [place] / Product Type: Medicare /    In time: 11:36am  Out time: 12:34am  Total Treatment Time (min): 58  Total Timed Codes (min): 58  1:1 Treatment Time ([facility] only): 45   Visit #:  2    Treatment Area: Right shoulder pain [M25.511]    SUBJECTIVE  Pain Level (0-10 scale): 0  Any medication changes, allergies to medications, adverse drug reactions, diagnosis change, or new procedure performed?: [x] No    [] Yes (see summary sheet for update)  Subjective functional status/changes:   [] No changes reported  The patient reports that really only did the cane overhead and the pinching of her shoulder blades at home. OBJECTIVE    58 min Therapeutic Exercise:  [x] See flow sheet :   Rationale: increase ROM, increase strength and improve coordination to improve the patients ability to perform daily activities. With   [x] TE   [] TA   [] neuro   [] other: Patient Education: [x] Review HEP    [x] Progressed/Changed HEP based on:   [x] positioning   [x] body mechanics   [] transfers   [] heat/ice application    [] other:      Other Objective/Functional Measures: Pt. Tolerated therex well. Pain Level (0-10 scale) post treatment: 0    ASSESSMENT/Changes in Function:   The patient is progressing with strengthening therex as tolerated. Patient will continue to benefit from skilled PT services to modify and progress therapeutic interventions, address functional mobility deficits, address ROM deficits, address strength deficits, analyze and address soft tissue restrictions, analyze and cue movement patterns, analyze and modify body mechanics/ergonomics, assess and modify postural abnormalities, address imbalance/dizziness and instruct in home and community integration to attain remaining goals.      [x]  See Plan of Care  []  See progress note/recertification  []  See Discharge Summary         Progress towards goals / Updated goals: The patient is progressing towards goals.      PLAN  [x]  Upgrade activities as tolerated     [x]  Continue plan of care  [x]  Update interventions per flow sheet       []  Discharge due to:_  []  Other:_      Venus Deluna, PT 8/14/2019

## 2019-08-22 ENCOUNTER — HOSPITAL ENCOUNTER (OUTPATIENT)
Dept: PHYSICAL THERAPY | Age: 84
Discharge: HOME | End: 2019-08-22
Payer: MEDICARE

## 2019-08-22 PROCEDURE — 97110 THERAPEUTIC EXERCISES: CPT

## 2019-08-22 NOTE — PROGRESS NOTES
PT DAILY TREATMENT NOTE - [facility] 2-15    Patient Name: Hermilo Gardner  Date:2019  : 12/15/1932  [x]  Patient  Verified  Payor: [place] MED[place] / Plan: [place] MEDICARE PART A & B / Product Type: Medicare /    In time:1033  Out time:1123  Total Treatment Time (min): 50  Total Timed Codes (min): 50  1:1 Treatment Time ([facility] only): 40   Visit #:  3    Treatment Area: Right shoulder pain [M25.511]    SUBJECTIVE  Pain Level (0-10 scale): 3/10  Any medication changes, allergies to medications, adverse drug reactions, diagnosis change, or new procedure performed?: [x] No    [] Yes (see summary sheet for update)  Subjective functional status/changes:   [] No changes reported  Patient reports she began to experience an \"ache\" last night which she hasn't experienced prior, the pain woke her op multiple times throughout the night. OBJECTIVE    50 min Therapeutic Exercise:  [x] See flow sheet :   Rationale: increase ROM and increase strength to improve the patients ability to perform ADLs and reduce pain levels          With   [] TE   [] TA   [] neuro   [] other: Patient Education: [x] Review HEP    [] Progressed/Changed HEP based on:   [] positioning   [] body mechanics   [] transfers   [] heat/ice application    [] other:      Other Objective/Functional Measures: none noted     Pain Level (0-10 scale) post treatment: 0/10    ASSESSMENT/Changes in Function:   Patient will experience pain at the decent of her thumb up abduction. Will demonstrate slight upper trap compensation. Will continue to progress as tolerated. Patient will continue to benefit from skilled PT services to modify and progress therapeutic interventions, address functional mobility deficits, address ROM deficits, address strength deficits, analyze and address soft tissue restrictions, analyze and cue movement patterns, analyze and modify body mechanics/ergonomics and assess and modify postural abnormalities to attain remaining goals.      [x]  See Plan of Care  []  See progress note/recertification  []  See Discharge Summary         Progress towards goals / Updated goals:  Patient is progressing towards goals.     PLAN  [x]  Upgrade activities as tolerated     [x]  Continue plan of care  [x]  Update interventions per flow sheet       []  Discharge due to:_  []  Other:_      Hero Melendez 8/22/2019

## 2019-08-28 ENCOUNTER — HOSPITAL ENCOUNTER (OUTPATIENT)
Dept: PHYSICAL THERAPY | Age: 84
Discharge: HOME | End: 2019-08-28
Payer: MEDICARE

## 2019-08-28 PROCEDURE — 97110 THERAPEUTIC EXERCISES: CPT | Performed by: PHYSICAL THERAPIST

## 2019-08-28 NOTE — PROGRESS NOTES
PT DAILY TREATMENT NOTE - [facility] 2-15    Patient Name: Jana Ch  Date:2019  : 12/15/1932  [x]  Patient  Verified  Payor: [place] MEDICARE / Plan: [place] MEDICARE PART A & B / Product Type: Medicare /    In time: 12:33pm  Out time: 1:35pm  Total Treatment Time (min): 62  Total Timed Codes (min): 62  1:1 Treatment Time ( [address] only): 55   Visit #:  4    Treatment Area: Right shoulder pain [M25.511]    SUBJECTIVE  Pain Level (0-10 scale): 3 with movement  Any medication changes, allergies to medications, adverse drug reactions, diagnosis change, or new procedure performed?: [x] No    [] Yes (see summary sheet for update)  Subjective functional status/changes:   [] No changes reported  The patient reports that her left shoulder has started to hurt a little, but only during some of the exercises, and her right shoulder hurts  only when she reaches. OBJECTIVE    62 min Therapeutic Exercise:  [x] See flow sheet :   Rationale: increase ROM, increase strength and improve coordination to improve the patients ability to perform daily activities. With   [x] TE   [] TA   [] neuro   [] other: Patient Education: [x] Review HEP    [x] Progressed/Changed HEP based on:   [x] positioning   [x] body mechanics   [] transfers   [] heat/ice application    [] other:      Other Objective/Functional Measures: Pt. Tolerated therex well. Pain Level (0-10 scale) post treatment: 3 with movement    ASSESSMENT/Changes in Function:   The patient progressed with strengthening exercises with increased resistance, but continues to have UT compensation during flexion.     Patient will continue to benefit from skilled PT services to modify and progress therapeutic interventions, address functional mobility deficits, address ROM deficits, address strength deficits, analyze and address soft tissue restrictions, analyze and cue movement patterns, analyze and modify body mechanics/ergonomics, assess and modify postural abnormalities, address imbalance/dizziness and instruct in home and community integration to attain remaining goals. [x]  See Plan of Care  []  See progress note/recertification  []  See Discharge Summary         Progress towards goals / Updated goals: The patient is progressing towards goals.      PLAN  [x]  Upgrade activities as tolerated     [x]  Continue plan of care  [x]  Update interventions per flow sheet       []  Discharge due to:_  []  Other:_      Corry Lane, PT 8/28/2019

## 2019-09-04 ENCOUNTER — HOSPITAL ENCOUNTER (OUTPATIENT)
Dept: PHYSICAL THERAPY | Age: 84
Discharge: HOME | End: 2019-09-04
Payer: MEDICARE

## 2019-09-04 PROCEDURE — 97110 THERAPEUTIC EXERCISES: CPT

## 2019-09-04 NOTE — PROGRESS NOTES
PT DAILY TREATMENT NOTE - [facility] 2-15    Patient Name: Mimi Pineda  Date:2019  : 12/15/1932  [x]  Patient  Verified  Payor: [place] MEDICARE / Plan: [place] MEDICARE PART A & B / Product Type: Medicare /    In time:1234  Out time:121  Total Treatment Time (min): 47  Total Timed Codes (min): 47  1:1 Treatment Time ( [address] only): 52   Visit #:  5    Treatment Area: Right shoulder pain [M25.511]    SUBJECTIVE  Pain Level (0-10 scale): 3/10  Any medication changes, allergies to medications, adverse drug reactions, diagnosis change, or new procedure performed?: [x] No    [] Yes (see summary sheet for update)  Subjective functional status/changes:   [] No changes reported  Patient reports she hasn't been doing her blue band exercises just because she just hasn't gotten around to it. OBJECTIVE    47 min Therapeutic Exercise:  [x] See flow sheet :   Rationale: increase ROM and increase strength to improve the patients ability to perform ADLs and reduce pain levels    With   [] TE   [] TA   [] neuro   [] other: Patient Education: [x] Review HEP    [] Progressed/Changed HEP based on:   [] positioning   [] body mechanics   [] transfers   [] heat/ice application    [] other:      Other Objective/Functional Measures: none noted     Pain Level (0-10 scale) post treatment: 0/10    ASSESSMENT/Changes in Function:   Patient will require multiple cues in order to properly perform therex. Will experience pain during the decent of most flexion and abduction therex. Will continue to show upper trap compensation patterns. Will continue to progress as tolerated.   Patient will continue to benefit from skilled PT services to modify and progress therapeutic interventions, address functional mobility deficits, address ROM deficits, address strength deficits, analyze and address soft tissue restrictions, analyze and cue movement patterns, analyze and modify body mechanics/ergonomics and assess and modify postural abnormalities to attain remaining goals. [x]  See Plan of Care  []  See progress note/recertification  []  See Discharge Summary         Progress towards goals / Updated goals:  Patient is progressing towards goals.      PLAN  [x]  Upgrade activities as tolerated     [x]  Continue plan of care  [x]  Update interventions per flow sheet       []  Discharge due to:_  []  Other:_      Erlin Portal 9/4/2019

## 2019-09-11 ENCOUNTER — APPOINTMENT (OUTPATIENT)
Dept: PHYSICAL THERAPY | Age: 84
End: 2019-09-11
Payer: MEDICARE

## 2019-10-01 ENCOUNTER — HOSPITAL ENCOUNTER (OUTPATIENT)
Dept: PHYSICAL THERAPY | Age: 84
Discharge: HOME | End: 2019-10-01
Payer: MEDICARE

## 2019-10-01 PROCEDURE — 97110 THERAPEUTIC EXERCISES: CPT

## 2019-10-01 NOTE — PROGRESS NOTES
PT DAILY TREATMENT NOTE - [facility] 2-15    Patient Name: Alesha Torres  Date:10/1/2019  : 12/15/1932  [x]  Patient  Verified  Payor: [place] MEDICARE / Plan: [place] MEDICARE PART A & B / Product Type: Medicare /    In time:12:32 pm  Out time:1:35 pm  Total Treatment Time (min): 63  Total Timed Codes (min): 63  1:1 Treatment Time ([address] only): 42   Visit #:  6    Treatment Area: Right shoulder pain [M25.511]    SUBJECTIVE  Pain Level (0-10 scale): 3/10  Any medication changes, allergies to medications, adverse drug reactions, diagnosis change, or new procedure performed?: [x] No    [] Yes (see summary sheet for update)  Subjective functional status/changes:   [] No changes reported   Pt reports she feels like she has been slightly better overall, still experiencing pain with certain movements and driving with her right hand. OBJECTIVE    47 min Therapeutic Exercise:  [x] See flow sheet :   Rationale: increase ROM and increase strength to improve the patients ability to perform ADLs and reduce pain levels    With   [] TE   [] TA   [] neuro   [] other: Patient Education: [x] Review HEP    [] Progressed/Changed HEP based on:   [] positioning   [] body mechanics   [] transfers   [] heat/ice application    [] other:      Other Objective/Functional Measures:   0-100: 20% improved  FOTO Score: 66/100  A/PROM Shoulder:                Right                           Left              Flexion                         150, p! Arc/180                  150/180              Abduction                    115, p! Arc/170 p! End-range                      150/nt              IR                                 Hand to t7 (no p!)/90              t3/nt               ER                               T2 p!/90 p! End-range                            nt/90     Pain Level (0-10 scale) post treatment: 0/10    ASSESSMENT/Changes in Function: Mod p! Resisted IR at 45 deg abduction, min p! With resisted ER in supine. Mod p!  With resisted abduction and flexion in 90 degrees flexion in supine. Pt able to perform lift offs without any increased pain. She noted p! Initially with s/l ER, however, cued for decreasing ER ROM and for appropriate positioning, and Pt was able to perform without pain. (updated ROM above). Patient will continue to benefit from skilled PT services to modify and progress therapeutic interventions, address functional mobility deficits, address ROM deficits, address strength deficits, analyze and address soft tissue restrictions, analyze and cue movement patterns, analyze and modify body mechanics/ergonomics and assess and modify postural abnormalities to attain remaining goals. []  See Plan of Care  [x]  See progress note/recertification  []  See Discharge Summary         Progress towards goals / Updated goals:  Short Term Goals: To be accomplished in 2 treatments:                         5.) The patient will be independent with their HEP consistently for at least one week. - [address] be accomplished in 16 treatments:                         1.) The patient will have at most 2/10 pain with daily activities. - Progressing                         8.) The patient will improve her right shoulder flexion to at least 150 degrees to assist with daily activities.  - MET                         5.) The patient will improve their FOTO score from 60 to at least 62 to show improvements in functional mobility. - MET (66/100)    PLAN  [x]  Upgrade activities as tolerated     [x]  Continue plan of care  [x]  Update interventions per flow sheet       []  Discharge due to:_  []  Other:_      Fer Guillory 10/1/2019

## 2019-10-02 NOTE — PROGRESS NOTES
[facility]   [address] [address]  Phone: [phone] Fax: [phone]    Progress Note    Name: Maggie Elizabeth   : 12/15/1932   MD: Darien Graves MD       Treatment Diagnosis: Right shoulder pain [M25.511]  Start of Care: 19    Visits from Start of Care: 6  Missed Visits: 0    Summary of Care: Ms. Sara Welch has been seen for 6 skilled physical therapy visits secondary to right shoulder pain and rotator cuff pathology. Pt has transitioned her care from [facility] to the [facility] for her most recent visit due to convenience of location, and was not seen for a period of 3 weeks beforehand. She notes mild improvement overall since initiating physical therapy, though continues to experience increased pain with certain movements as well as with driving. She demonstrates some improvement in both passive and active glenohumeral ROM (shown below). Pt would benefit from additional skilled PT to further improve scapular control, rotator cuff activation, and glenohumeral stability with goal of increasing ROM with minimized pain. Thank you for this referral!     Assessment / Recommendations: 6 additional visits    Right A/PROM Shoulder:               AROM                             PROM              Flexion                         140, p!; 150 painful arc 160; 180                             Abduction                    90, p!; 115 painful arc NT; 170 p! At end-range                                 IR                                 Hand to T7; T7 (no p!) 90                          ER                               NT; T2 p!   90 p!  At end-range                           Left A/PROM Shoulder:        AROM  PROM                 Flexion                        150   180              Abduction                    150    NT              IR                                 T3   NT             IM                               HW   89        Short Term Goals: To be accomplished in 2 treatments:                         8.) The patient will be independent with their HEP consistently for at least one week. - [address] be accomplished in 16 treatments:                         6.) The patient will have at most 2/10 pain with daily activities. - Progressing                         0.) The patient will improve her right shoulder flexion to at least 150 degrees to assist with daily activities. - MET                         5.) The patient will improve their FOTO score from 60 to at least 62 to show improvements in functional mobility. - MET (66/100)       Asmita Carranza 10/2/2019     ________________________________________________________________________  NOTE TO PHYSICIAN:  Please complete the following and fax to: [facility]: Fax: [phone]. Sharad Early Retain this original for your records. If you are unable to process this request in 24 hours, please contact our office.        ____ I have read the above report and request that my patient continue therapy with the following changes/special instructions:  ____ I have read the above report and request that my patient be discharged from therapy    Physician's Signature:_________________ Date:___________Time:__________

## 2019-10-03 ENCOUNTER — APPOINTMENT (OUTPATIENT)
Dept: PHYSICAL THERAPY | Age: 84
End: 2019-10-03

## 2019-10-08 ENCOUNTER — HOSPITAL ENCOUNTER (OUTPATIENT)
Dept: PHYSICAL THERAPY | Age: 84
Discharge: HOME | End: 2019-10-08
Payer: MEDICARE

## 2019-10-08 PROCEDURE — 97110 THERAPEUTIC EXERCISES: CPT

## 2019-10-08 PROCEDURE — 97112 NEUROMUSCULAR REEDUCATION: CPT

## 2019-10-08 NOTE — PROGRESS NOTES
PT DAILY TREATMENT NOTE - [facility] 2-15    Patient Name: William Wood  Date:10/8/2019  : 12/15/1932  [x]  Patient  Verified  Payor: [place] MED[place]ARE / Plan: [place] MEDICARE PART A & B / Product Type: Medicare /    In time:12:10 pm  Out time: 1:08 pm  Total Treatment Time (min): 58  Total Timed Codes (min): 58  1:1 Treatment Time ([facility] only): 41  Visit #:  7    Treatment Area: Right shoulder pain [M25.511]    SUBJECTIVE  Pain Level (0-10 scale): 0/10  Any medication changes, allergies to medications, adverse drug reactions, diagnosis change, or new procedure performed?: [x] No    [] Yes (see summary sheet for update)  Subjective functional status/changes:   [] No changes reported   Pt reports she is feeling a little better and is not having the same pain in her right shoulder. She did not notice as much restriction or discomfort while putting on a blouse. Pt has only been doing supine cane flexion and seated bilateral ER occasionally. Pt states she forgot to say last session that she experienced pain/stiffness 2 weeks ago down right arm and immobilized right hand for a day; she has not experienced these symptoms since then. OBJECTIVE    47 min Therapeutic Exercise:  [x] See flow sheet :   Rationale: increase ROM and increase strength to improve the patients ability to perform ADLs and reduce pain levels    11 min Neuromuscular Re-education:  []  See flow sheet :   Rationale: increase ROM, increase strength and increase proprioception  to improve the patients ability to perform ADLs with reduced pain levels      With   [] TE   [] TA   [] neuro   [] other: Patient Education: [x] Review HEP    [] Progressed/Changed HEP based on:   [] positioning   [] body mechanics   [] transfers   [] heat/ice application    [] other:      Other Objective/Functional Measures: --  A/PROM Shoulder:                Right                           Left              Flexion                         153 mild p!  At end-range/180 150/180              Abduction                    150, p! Arc/170 p! End-range                      150/nt              IR                                 Hand to t7 (no p!)/90              t3/nt               ER                               T2 p!/90 p! End-range                            nt/90     Pain Level (0-10 scale) post treatment: 0/10    ASSESSMENT/Changes in Function:   Emphasized scapular stabilization today; Pt able to perform rhythmic stab at 45 degrees abduction, minimal pain noted with IR which was relieved with decreased resistance; Pt unable to tolerate D1 or D2 PNF of UE due to impingement, though able to perform PNF agonist-reversal of right scapula in left s/l withot any pain. Added band IR to HEP to promote increased activation and strength of subscap, cuing for form/technique; Pt noted pain initially, though denied any pain with slightly less resistance closer to the wall. Patient will continue to benefit from skilled PT services to modify and progress therapeutic interventions, address functional mobility deficits, address ROM deficits, address strength deficits, analyze and address soft tissue restrictions, analyze and cue movement patterns, analyze and modify body mechanics/ergonomics and assess and modify postural abnormalities to attain remaining goals. []  See Plan of Care  [x]  See progress note/recertification  []  See Discharge Summary         Progress towards goals / Updated goals:  Short Term Goals: To be accomplished in 2 treatments:                         9.) The patient will be independent with their HEP consistently for at least one week. - [address] be accomplished in 16 treatments:                         0.) The patient will have at most 2/10 pain with daily activities. - Progressing                         3.) The patient will improve her right shoulder flexion to at least 150 degrees to assist with daily activities.  - MET                         6.) The patient will improve their FOTO score from 60 to at least 62 to show improvements in functional mobility. - MET (66/100)    PLAN  [x]  Upgrade activities as tolerated     [x]  Continue plan of care  [x]  Update interventions per flow sheet       []  Discharge due to:_  []  Other:_      Holly Hackett 10/8/2019

## 2019-10-17 ENCOUNTER — HOSPITAL ENCOUNTER (OUTPATIENT)
Dept: PHYSICAL THERAPY | Age: 84
Discharge: HOME | End: 2019-10-17
Payer: MEDICARE

## 2019-10-17 PROCEDURE — 97110 THERAPEUTIC EXERCISES: CPT

## 2019-10-17 NOTE — PROGRESS NOTES
PT DAILY TREATMENT NOTE - [facility] 2-15    Patient Name: Hector Urena  Date:10/17/2019  : 12/15/1932  [x]  Patient  Verified  Payor: Elli Arce / Plan: [place] MEDICARE PART A & B / Product Type: Medicare /    In time:10:40 am  Out time: 11:30 am  Total Treatment Time (min): 50  Total Timed Codes (min): 50  1:1 Treatment Time ( [address] only): 31  Visit #:  8    Treatment Area: Right shoulder pain [M25.511]    SUBJECTIVE  Pain Level (0-10 scale): 0/10  Any medication changes, allergies to medications, adverse drug reactions, diagnosis change, or new procedure performed?: [x] No    [] Yes (see summary sheet for update)  Subjective functional status/changes:   [] No changes reported   Pt reports she did not notice any pain in her shoulder yesterday. However, she noticed pain in proximal right arm with driving this morning. She has only done the bilateral ER exercise at home, but finds it hard to keep from shrugging. OBJECTIVE    50 min Therapeutic Exercise:  [x] See flow sheet :   Rationale: increase ROM and increase strength to improve the patients ability to perform ADLs and reduce pain levels    With   [x] TE   [] TA   [] neuro   [] other: Patient Education: [x] Review HEP    [] Progressed/Changed HEP based on:   [] positioning   [] body mechanics   [] transfers   [] heat/ice application    [] other:      Other Objective/Functional Measures: --  A/PROM Shoulder:                Right                           Left              Flexion                         153 mild p! At end-range/180                  150/180              Abduction                    160, p! Arc/170 p! End-range                      150/nt              IR                                 Hand to t7 (no p!)/90              t3/nt               ER                               T2 p!/90 p!  End-range                            nt/90     Pain Level (0-10 scale) post treatment: 0/10    ASSESSMENT/Changes in Function:   Reviewed bilateral ER exercise and Pt required significant cuing for proper form and technique; widening , keeping elbows at her side, lowering hands, and reducing shoulder shrug. Pt demonstrating little carryover and is unable to remember an exercise earlier in the same session; will utilize last few sessions to continue emphasizing shoulder and scapular stability before discharging. Patient will continue to benefit from skilled PT services to modify and progress therapeutic interventions, address functional mobility deficits, address ROM deficits, address strength deficits, analyze and address soft tissue restrictions, analyze and cue movement patterns, analyze and modify body mechanics/ergonomics and assess and modify postural abnormalities to attain remaining goals. []  See Plan of Care  [x]  See progress note/recertification  []  See Discharge Summary         Progress towards goals / Updated goals:  Short Term Goals: To be accomplished in 2 treatments:                         7.) The patient will be independent with their HEP consistently for at least one week. - [address] be accomplished in 16 treatments:                         0.) The patient will have at most 2/10 pain with daily activities. - Progressing                         3.) The patient will improve her right shoulder flexion to at least 150 degrees to assist with daily activities.  - MET                         9.) The patient will improve their FOTO score from 60 to at least 62 to show improvements in functional mobility. - MET (66/100)    PLAN  [x]  Upgrade activities as tolerated     [x]  Continue plan of care  [x]  Update interventions per flow sheet       []  Discharge due to:_  []  Other:_      Meg Bass 10/17/2019

## 2019-10-22 ENCOUNTER — HOSPITAL ENCOUNTER (OUTPATIENT)
Dept: PHYSICAL THERAPY | Age: 84
Discharge: HOME | End: 2019-10-22
Payer: MEDICARE

## 2019-10-22 PROCEDURE — 97110 THERAPEUTIC EXERCISES: CPT

## 2019-10-22 NOTE — PROGRESS NOTES
PT DAILY TREATMENT NOTE - [facility] 2-15    Patient Name: Ritchie Perez  Date:10/22/2019  : 12/15/1932  [x]  Patient  Verified  Payor: Daija Shea / Plan: [place] MEDICARE PART A & B / Product Type: Medicare /    In time:12:05pm  Out time: 12:50 pm  Total Treatment Time (min): 45  Total Timed Codes (min): 45  1:1 Treatment Time ( [address] only): 30  Visit #:  9    Treatment Area: Right shoulder pain [M25.511]    SUBJECTIVE  Pain Level (0-10 scale): 0/10  Any medication changes, allergies to medications, adverse drug reactions, diagnosis change, or new procedure performed?: [x] No    [] Yes (see summary sheet for update)  Subjective functional status/changes:   [] No changes reported   Pt reports feeling somewhat better. Her pain is off and on, noting occasional twinges of pain in anterior right shoulder. OBJECTIVE    45 min Therapeutic Exercise:  [x] See flow sheet :   Rationale: increase ROM and increase strength to improve the patients ability to perform ADLs and reduce pain levels    With   [x] TE   [] TA   [] neuro   [] other: Patient Education: [x] Review HEP    [] Progressed/Changed HEP based on:   [] positioning   [] body mechanics   [] transfers   [] heat/ice application    [] other:      Other Objective/Functional Measures: --  A/PROM Shoulder:                Right                           Left              Flexion                         170 no p!/180                  150/180              Abduction                    170, no p!/170 p! End-range                      150/nt              IR                                 Hand to t7 (no p!)/90              t3/nt               ER                               T2/90 p! End-range                            nt/90     Pain Level (0-10 scale) post treatment: 0/10    ASSESSMENT/Changes in Function:   Pt demonstrated full glenohumeral AROM today without c/o pain throughout motion or at end-range.  Continued to provide frequent cuing for proper form and technique with exercises at facilitate activation of appropriate musculature. Will utilize next session as D/C barring setback. Patient will continue to benefit from skilled PT services to modify and progress therapeutic interventions, address functional mobility deficits, address ROM deficits, address strength deficits, analyze and address soft tissue restrictions, analyze and cue movement patterns, analyze and modify body mechanics/ergonomics and assess and modify postural abnormalities to attain remaining goals. []  See Plan of Care  [x]  See progress note/recertification  []  See Discharge Summary         Progress towards goals / Updated goals:  Short Term Goals: To be accomplished in 2 treatments:                         3.) The patient will be independent with their HEP consistently for at least one week. - [address] be accomplished in 16 treatments:                         1.) The patient will have at most 2/10 pain with daily activities. - Progressing                         4.) The patient will improve her right shoulder flexion to at least 150 degrees to assist with daily activities.  - MET                         4.) The patient will improve their FOTO score from 60 to at least 62 to show improvements in functional mobility. - MET (66/100)    PLAN  [x]  Upgrade activities as tolerated     [x]  Continue plan of care  [x]  Update interventions per flow sheet       []  Discharge due to:_  []  Other:_      Fer Guillory 10/22/2019

## 2019-10-29 ENCOUNTER — HOSPITAL ENCOUNTER (OUTPATIENT)
Dept: PHYSICAL THERAPY | Age: 84
Discharge: HOME | End: 2019-10-29
Payer: MEDICARE

## 2019-10-29 PROCEDURE — 97110 THERAPEUTIC EXERCISES: CPT

## 2019-10-29 NOTE — PROGRESS NOTES
PT DAILY TREATMENT NOTE - [facility] 2-15    Patient Name: Virginia Lester  Date:10/29/2019  : 12/15/1932  [x]  Patient  Verified  Payor: Priscilla Rosas / Plan: [place] MEDICARE PART A & B / Product Type: Medicare /    In time:11:08 pm  Out time: 12:00 pm  Total Treatment Time (min): 52  Total Timed Codes (min): 52  1:1 Treatment Time ( [address] only): 40  Visit #:  10    Treatment Area: Right shoulder pain [M25.511]    SUBJECTIVE  Pain Level (0-10 scale): 0/10  Any medication changes, allergies to medications, adverse drug reactions, diagnosis change, or new procedure performed?: [x] No    [] Yes (see summary sheet for update)  Subjective functional status/changes:   [] No changes reported   Pt reports she has been feeling good overall. She notes some discomfort with putting on long sleeve shirts or jackets, but otherwise has not been experiencing pain.       OBJECTIVE    52 min Therapeutic Exercise:  [x] See flow sheet :   Rationale: increase ROM and increase strength to improve the patients ability to perform ADLs and reduce pain levels    With   [x] TE   [] TA   [] neuro   [] other: Patient Education: [x] Review HEP    [] Progressed/Changed HEP based on:   [] positioning   [] body mechanics   [] transfers   [] heat/ice application    [] other:      Other Objective/Functional Measures:   0-100: 50% improved - twinges occasionally - easier to dress with less pain, driving has been better, able to reach up overhead without pain  FOTO Score: 63/100    AROM Shoulder:                Right                                        Flexion                         170 no p! (140 on eval)                             Abduction                    150 no p! (90 on eval)                                   IR                                 Hand to t7 (no p!)                    ER                               Hand to T2 (no p!)                           Pain Level (0-10 scale) post treatment: 0/10    ASSESSMENT/Changes in Function:     []  See Plan of Care  []  See progress note/recertification  [x]  See Discharge Summary         Progress towards goals / Updated goals:  Short Term Goals: To be accomplished in 2 treatments:                         6.) The patient will be independent with their HEP consistently for at least one week. - NOT MET  Long Term Goals: To be accomplished in 16 treatments:                         6.) The patient will have at most 2/10 pain with daily activities. - MET                         5.) The patient will improve her right shoulder flexion to at least 150 degrees to assist with daily activities. - MET                         4.) The patient will improve their FOTO score from 60 to at least 62 to show improvements in functional mobility. - MET (66/100)    PLAN  []  Upgrade activities as tolerated     []  Continue plan of care  []  Update interventions per flow sheet       [x]  Discharge due to: Pt reaching or progressing towards all short and long-term goals.   []  Other:_      Nonda Sides 10/29/2019

## 2019-10-29 NOTE — ANCILLARY DISCHARGE INSTRUCTIONS
[facility] [id number] [address], [address] [facility], [facility] [facility] [address] Phone: [phone] Fax: [phone] Discharge Summary 2-15 Patient name: Jovanny Zuleta  : 12/15/1932  Provider#: [id number] Referral source: Jose De Jesus Juarez MD     
Medical/Treatment Diagnosis: Right shoulder pain [M25.511] Prior Hospitalization: see medical history CComorbidities: back pain, BMI over 30, HTN, incontinence, kidney problems, visual impairment Prior Level of Function: 20 min of exercise at least 3x/wk Medications: Verified on Patient Summary List 
 
Start of Care: 19                                                                 Onset Date: chronic Visits from Start of Care: 10     Missed Visits: 0 Reporting Period : 19 to 10/29/19 Assessment/Summary of care: Ms. Orly Stoner was seen for a total of 10 skilled physical therapy visits secondary to chronic right shoulder pain. Pt demonstrated good progress with her therapy program which emphasized improving rotator cuff, deltoid, and periscapular stability, decreasing tightness of surrounding musculature, and improving postural awareness. Pt impaired memory hindered carry-over and diligent/correct performance of her HEP, however, Pt still demonstrated significant improved right glenohumeral AROM with decreased pain (shown below). She also presents with decreased right upper extremity edema and notes she is able to dress with more ease and drive with less discomfort. Pt has been provided a comprehensive HEP to further progress independently.  Thank you for this referral! 
 
AROM Shoulder:                Right                           
            Flexion                         170 no p! (140 on eval)                
            Abduction                    150 no p! (90 on eval)                      
            IR                                 Hand to t7 (no p!)       
             LYNN                               HCNH to T2 (no p!)                        
  
Short Term Goals: To be accomplished in 2 treatments: 
                       7.) The patient will be independent with their HEP consistently for at least one week. - NOT MET Long Term Goals: To be accomplished in 16 treatments: 
                       6.) The patient will have at most 2/10 pain with daily activities. - MET 
                       3.) The patient will improve her right shoulder flexion to at least 150 degrees to assist with daily activities. - MET 
                       3.) The patient will improve their FOTO score from 60 to at least 62 to show improvements in functional mobility. - MET (66/100) RECOMMENDATIONS: 
[x]Discontinue therapy: [x]Patient has reached or is progressing toward set goals []Patient is non-compliant or has abdicated 
   []Due to lack of appreciable progress towards set goals Anuradha Charles 10/29/2019

## 2020-01-07 ENCOUNTER — OFFICE VISIT (OUTPATIENT)
Dept: URGENT CARE | Age: 85
End: 2020-01-07

## 2020-01-07 VITALS
TEMPERATURE: 98.5 F | SYSTOLIC BLOOD PRESSURE: 170 MMHG | DIASTOLIC BLOOD PRESSURE: 75 MMHG | WEIGHT: 167 LBS | RESPIRATION RATE: 16 BRPM | OXYGEN SATURATION: 98 % | HEART RATE: 61 BPM | HEIGHT: 65 IN | BODY MASS INDEX: 27.82 KG/M2

## 2020-01-07 DIAGNOSIS — R05.9 COUGH: Primary | ICD-10-CM

## 2020-01-07 RX ORDER — BENZONATATE 100 MG/1
100 CAPSULE ORAL
Qty: 20 CAP | Refills: 0 | Status: SHIPPED | OUTPATIENT
Start: 2020-01-07

## 2020-01-07 NOTE — PATIENT INSTRUCTIONS
Cough: Care Instructions  Your Care Instructions    A cough is your body's response to something that bothers your throat or airways. Many things can cause a cough. You might cough because of a cold or the flu, bronchitis, or asthma. Smoking, postnasal drip, allergies, and stomach acid that backs up into your throat also can cause coughs. A cough is a symptom, not a disease. Most coughs stop when the cause, such as a cold, goes away. You can take a few steps at home to cough less and feel better. Follow-up care is a key part of your treatment and safety. Be sure to make and go to all appointments, and call your doctor if you are having problems. It's also a good idea to know your test results and keep a list of the medicines you take. How can you care for yourself at home? · Drink lots of water and other fluids. This helps thin the mucus and soothes a dry or sore throat. Honey or lemon juice in hot water or tea may ease a dry cough. · Take cough medicine as directed by your doctor. · Prop up your head on pillows to help you breathe and ease a dry cough. · Try cough drops to soothe a dry or sore throat. Cough drops don't stop a cough. Medicine-flavored cough drops are no better than candy-flavored drops or hard candy. · Do not smoke. Avoid secondhand smoke. If you need help quitting, talk to your doctor about stop-smoking programs and medicines. These can increase your chances of quitting for good. When should you call for help? Call 911 anytime you think you may need emergency care.  For example, call if:    · You have severe trouble breathing.    Call your doctor now or seek immediate medical care if:    · You cough up blood.     · You have new or worse trouble breathing.     · You have a new or higher fever.     · You have a new rash.    Watch closely for changes in your health, and be sure to contact your doctor if:    · You cough more deeply or more often, especially if you notice more mucus or a change in the color of your mucus.     · You have new symptoms, such as a sore throat, an earache, or sinus pain.     · You do not get better as expected. Where can you learn more? Go to http://concepcion-richard.info/. Enter D279 in the search box to learn more about \"Cough: Care Instructions. \"  Current as of: June 9, 2019  Content Version: 12.2  © 6162-1487 [employer]. Care instructions adapted under license by [employer] (which disclaims liability or warranty for this information). If you have questions about a medical condition or this instruction, always ask your healthcare professional. [address] any warranty or liability for your use of this information.

## 2020-01-07 NOTE — PROGRESS NOTES
Cough   The history is provided by the patient. This is a new problem. Episode onset: 1 month ago. The problem occurs every few minutes. The problem has not changed since onset. The cough is non-productive. There has been no fever. Associated symptoms include rhinorrhea. Pertinent negatives include no chest pain, no chills, no sweats, no ear congestion, no ear pain, no headaches, no sore throat, no myalgias, no shortness of breath, no wheezing, no nausea and no vomiting. She is not a smoker.         Past Medical History:   Diagnosis Date    Advance directive discussed with patient 06/22/2016    Allergic rhinitis     Bone spur 09/2013    Rt shoulder, Dr. Nita Pederson impaction 12/20/15    [facility]    Chronic pain     left hip pain in the am    Cystocele 10/20/14    with nocturia [facility] rec'd     DDD (degenerative disc disease)     Elevated lipids     Essential hypertension     Hematuria 10/2008    renal cyst found on ultrasound but acute cystitis [facility]    Jaw fracture ([address] 11/2017    Laceration of foot 12/23/2016    flap type-sutures not done plain Td given    Left sided lacunar infarction ([address] 12/28/15    found on CT scan of head-old issue    Macular degeneration     [facility] dr Gianna Solo    OM (otitis media), acute 6/4/15    [facility] and 10/14/15 ENT Lonny Goodman    Prolapse of female bladder, acquired 2012    Was treated with PT    Screening for glaucoma 02/27/2017 & 5/23/17    [place] [facility]    Swelling of limb, right     Thyroid nodule [id number]    colloid nodule dr Mely Matthews    Tinnitus 1/6/16     Carldamaris Jaspal -neck mass being monitored    Urinary urgency 2015          3/9/15    Physical Therapy thru [facility] rec'd    Vitamin D deficiency 10/2013        Past Surgical History:   Procedure Laterality Date    BREAST SURGERY PROCEDURE UNLISTED  1980    cyst right breast    ECHOCARDIOGRAM  1/2004    mild LVH on echo    ENDOSCOPY, COLON, DIAGNOSTIC  2006 thru [facility]    HX CATARACT REMOVAL      left    HX CHOLECYSTECTOMY      HX GYN  2006    D and C    HX HEENT  4/2010    right cataract    HX OTHER SURGICAL  1/2009    foreign body granuloma dr Frieda Wing Left 2/1/16    carotid dopplers done/old [facility]    HX UROLOGICAL  05/2015    pt states she has a prolapsed bladder but did not have any surgeries.  she does kegel exercies          Family History   Problem Relation Age of Onset   Leatha Arnel Cancer Sister         lung    Hypertension Sister     Stroke Brother     Heart Disease Brother     Cancer Sister         breast    Cancer Father         brain tumor        Social History     Socioeconomic History    Marital status: SINGLE     Spouse name: Not on file    Number of children: Not on file    Years of education: Not on file    Highest education level: Not on file   Occupational History    Not on file   Social Needs    Financial resource strain: Not on file    Food insecurity:     Worry: Not on file     Inability: Not on file    Transportation needs:     Medical: Not on file     Non-medical: Not on file   Tobacco Use    Smoking status: Never Smoker    Smokeless tobacco: Never Used   Substance and Sexual Activity    Alcohol use: Yes     Comment: social    Drug use: No    Sexual activity: Not Currently   Lifestyle    Physical activity:     Days per week: Not on file     Minutes per session: Not on file    Stress: Not on file   Relationships    Social connections:     Talks on phone: Not on file     Gets together: Not on file     Attends [employer] service: Not on file     Active member of club or organization: Not on file     Attends meetings of clubs or organizations: Not on file     Relationship status: Not on file    Intimate partner violence:     Fear of current or ex partner: Not on file     Emotionally abused: Not on file     Physically abused: Not on file     Forced sexual activity: Not on file   Other Topics Concern    Not on file   Social History Narrative    Not on file                ALLERGIES: Caffeine and Lisinopril-hydrochlorothiazide    Review of Systems   Constitutional: Negative for activity change, appetite change, chills and fever. HENT: Positive for rhinorrhea. Negative for congestion, ear pain, sinus pressure, sinus pain, sore throat and trouble swallowing. Respiratory: Positive for cough. Negative for shortness of breath and wheezing. Cardiovascular: Negative for chest pain and palpitations. Gastrointestinal: Negative for nausea and vomiting. Musculoskeletal: Negative for myalgias. Neurological: Negative for dizziness and headaches. Hematological: Negative for adenopathy. Vitals:    01/07/20 1419   BP: 170/75   Pulse: 61   Resp: 16   Temp: 98.5 °F (36.9 °C)   SpO2: 98%   Weight: 167 lb (75.8 kg)   Height: 5' 5\" (1.651 m)       Physical Exam  Vitals signs and nursing note reviewed. Constitutional:       General: She is not in acute distress. Appearance: She is well-developed. She is not diaphoretic. HENT:      Right Ear: Tympanic membrane, ear canal and external ear normal.      Left Ear: Tympanic membrane, ear canal and external ear normal.      Nose: Nose normal.      Right Sinus: No maxillary sinus tenderness or frontal sinus tenderness. Left Sinus: No maxillary sinus tenderness or frontal sinus tenderness. Mouth/Throat:      Pharynx: No oropharyngeal exudate or posterior oropharyngeal erythema. Tonsils: No tonsillar abscesses. Cardiovascular:      Rate and Rhythm: Normal rate and regular rhythm. Heart sounds: Normal heart sounds. Pulmonary:      Effort: Pulmonary effort is normal. No respiratory distress. Breath sounds: Normal breath sounds. No wheezing or rales. Lymphadenopathy:      Cervical: No cervical adenopathy. Neurological:      Mental Status: She is alert. Psychiatric:         Behavior: Behavior normal.         Thought Content:  Thought content normal.         Judgment: Judgment normal.         [facility]    ICD-10-CM ICD-9-CM   1. Cough R05 786.2       Orders Placed This Encounter    XR CHEST PA LAT     Standing Status:   Future     Number of Occurrences:   1     Standing Expiration Date:   2/6/2021     Order Specific Question:   Reason for Exam     Answer:   cough x 1month    benzonatate (TESSALON PERLES) 100 mg capsule     Sig: Take 1 Cap by mouth three (3) times daily as needed for Cough. Dispense:  20 Cap     Refill:  0        The patient is to follow up with PCP. If signs and symptoms become worse the pt is to go to the ER. XR Results (most recent):  Results from Appointment encounter on 01/07/20   XR CHEST PA LAT    Narrative EXAM:  CR chest PA lateral    INDICATION: Cough for one month    COMPARISON: 7/21/2017. TECHNIQUE: Frontal and lateral chest views    FINDINGS: The cardiomediastinal contours are stable. The lungs and pleural  spaces are clear. There is no pneumothorax. The bones and upper abdomen are  stable. Impression IMPRESSION: No acute process.           Procedures

## 2020-02-11 ENCOUNTER — OFFICE VISIT (OUTPATIENT)
Dept: FAMILY MEDICINE CLINIC | Age: 85
End: 2020-02-11

## 2020-02-11 VITALS
HEART RATE: 67 BPM | WEIGHT: 172.5 LBS | HEIGHT: 65 IN | DIASTOLIC BLOOD PRESSURE: 70 MMHG | BODY MASS INDEX: 28.74 KG/M2 | OXYGEN SATURATION: 94 % | RESPIRATION RATE: 18 BRPM | TEMPERATURE: 98.1 F | SYSTOLIC BLOOD PRESSURE: 110 MMHG

## 2020-02-11 DIAGNOSIS — I49.5 SSS (SICK SINUS SYNDROME) (HCC): ICD-10-CM

## 2020-02-11 DIAGNOSIS — R10.32 LEFT LOWER QUADRANT ABDOMINAL PAIN: ICD-10-CM

## 2020-02-11 DIAGNOSIS — Z00.00 MEDICARE ANNUAL WELLNESS VISIT, SUBSEQUENT: Primary | ICD-10-CM

## 2020-02-11 DIAGNOSIS — R82.81 PYURIA: ICD-10-CM

## 2020-02-11 DIAGNOSIS — Z71.89 ADVANCED DIRECTIVES, COUNSELING/DISCUSSION: ICD-10-CM

## 2020-02-11 LAB
BILIRUB UR QL STRIP: NEGATIVE
GLUCOSE UR-MCNC: NEGATIVE MG/DL
KETONES P FAST UR STRIP-MCNC: NEGATIVE MG/DL
PH UR STRIP: 5.5 [PH] (ref 4.6–8)
PROT UR QL STRIP: NEGATIVE
SP GR UR STRIP: 1.01 (ref 1–1.03)
UA UROBILINOGEN AMB POC: NORMAL (ref 0.2–1)
URINALYSIS CLARITY POC: CLEAR
URINALYSIS COLOR POC: YELLOW
URINE BLOOD POC: NORMAL
URINE LEUKOCYTES POC: NORMAL
URINE NITRITES POC: NEGATIVE

## 2020-02-11 NOTE — ACP (ADVANCE CARE PLANNING)
Advance Care Planning    Advance Care Planning (ACP) Provider Conversation Snapshot    Date of ACP Conversation: 02/11/20  Persons included in Conversation:  patient  Length of ACP Conversation in minutes:  <16 minutes (Non-Billable)    Authorized Decision Maker (if patient is incapable of making informed decisions): This person is:    Other Legally Authorized Decision Maker (e.g. Next of Kin)  Daughter-Roberta          For Patients with Decision Making Capacity:   Values/Goals: Exploration of values, goals, and preferences if recovery is not expected, even with continued medical treatment in the event of:  Imminent death  Severe, permanent brain injury    Conversation Outcomes / Follow-Up Plan:   Recommended completion of Advance Directive form after review of ACP materials and conversation with prospective healthcare agent

## 2020-02-11 NOTE — PROGRESS NOTES
This is the Subsequent Medicare Annual Wellness Exam, performed 12 months or more after the Initial AWV or the last Subsequent AWV    I have reviewed the patient's medical history in detail and updated the computerized patient record. Eye doctor q 3-4 mos for MD with shots. Dentist 2 x annually. Colonoscopy '75. Done. Mammo annually. Card annually. ENT q 4-5 mos. Was monthly. Right arm improved. Did PT. [facility]rol q 3 mos for bladder prolapse. Has pessary. No signif hx past yr. Not working on diet or exercise. Plans to walk more. Pain lower back and LLQ. Pain in AM resolves after urinates.       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 Olivera Villegas impaction 12/20/15    [facility]    Chronic pain     left hip pain in the am    Cystocele 10/20/14    with nocturia [facility] rec'd     DDD (degenerative disc disease)     Elevated lipids     Essential hypertension     Hematuria 10/2008    renal cyst found on ultrasound but acute cystitis [facility]    Jaw fracture ([address] 11/2017    Laceration of foot 12/23/2016    flap type-sutures not done plain Td given    Left sided lacunar infarction ([address] 12/28/15    found on CT scan of head-old issue    Macular degeneration     [facility] dr Didi Ribe[place]    OM (otitis media), acute 6/4/15    [facility] and 10/14/15 ENT Kiera Brenner    Prolapse of female bladder, acquired 2012    Was treated with PT    Screening for glaucoma 02/27/2017 & 5/23/17    [place] [facility]    Swelling of limb, right     Thyroid nodule [id number]    colloid nodule dr Devi Murillo    Tinnitus 1/6/16     Mikki Deer -neck mass being monitored    Urinary urgency 2015          3/9/15    Physical Therapy [facility] rec[facility]    Vitamin D deficiency 10/2013      Past Surgical History:   Procedure Laterality Date    BREAST SURGERY PROCEDURE UNLISTED  1980    cyst right breast    ECHOCARDIOGRAM  1/2004    mild LVH on echo    ENDOSCOPY, COLON, DIAGNOSTIC  2006    [facility]ru [facility]    HX CATARACT REMOVAL      left    HX CHOLECYSTECTOMY      HX GYN  2006    D and C    HX HEENT  4/2010    right cataract    HX OTHER SURGICAL  1/2009    foreign body granuloma dr Ward Hand Left 2/1/16    carotid dopplers done/old [facility]    HX UROLOGICAL  05/2015    pt states she has a prolapsed bladder but did not have any surgeries. she does kegel exercies      Current Outpatient Medications   Medication Sig Dispense Refill    trospium (SANCTURA XL) 60 mg capsule TAKE 1 CAPSULE BY MOUTH EVERY DAY  10    losartan (COZAAR) 50 mg tablet Take 1 Tab by mouth daily. 90 Tab 3    CHOLECALCIFEROL, VITAMIN D3, (VITAMIN D3 PO) Take 1 Tab by mouth daily.  peg 400-propylene glycol (SYSTANE) 0.4-0.3 % drop Administer 1 Drop to both eyes nightly.  fexofenadine (ALLEGRA) 180 mg tablet Take 180 mg by mouth daily.  benzonatate (TESSALON PERLES) 100 mg capsule Take 1 Cap by mouth three (3) times daily as needed for Cough.  20 Cap 0     Allergies   Allergen Reactions    Caffeine Other (comments)     insomnia    Lisinopril-Hydrochlorothiazide Rash       Family History   Problem Relation Age of Onset    Cancer Sister         lung    Hypertension Sister     Stroke Brother     Heart Disease Brother     Cancer Sister         breast    Cancer Father         brain tumor     Social History     Tobacco Use    Smoking status: Never Smoker    Smokeless tobacco: Never Used   Substance Use Topics    Alcohol use: Yes     Comment: social       Depression Risk Factor Screening:     3 most recent PHQ Screens 2/11/2020   Little interest or pleasure in doing things Not at all   Feeling down, depressed, irritable, or hopeless Not at all   Total Score PHQ 2 0       Alcohol Risk Factor Screening:   Do you average 1 drink per night or more than 7 drinks a week:  No    On any one occasion in the past three months have you have had more than 3 drinks containing alcohol:  No      Functional Ability and Level of Safety:   Hearing: The patient needs further evaluation. Activities of Daily Living: The home contains: handrails and rugs  Patient does total self care    Ambulation: with mild difficulty    Fall Risk:  Fall Risk Assessment, last 12 mths 2/11/2020   Able to walk? Yes   Fall in past 12 months? No   Fall with injury? -   Number of falls in past 12 months -   Fall Risk Score -       Abuse Screen:  Patient is not abused    Cognitive Screening   Has your family/caregiver stated any concerns about your memory: yes - ND  Cognitive Screening: ND    Patient Care Team   Patient Care Team:  Bard Michele MD as PCP - Veronica Rosa MD as PCP - [facility] [place] Provider  Jenny Larson MD (Endocrinology)  Angie Reynolds MD (Ophthalmology)  Daniele Hammond MD (Cardiology)  Juan Luis Das MD (Otolaryngology)  Susan Bryson MD (Ophthalmology)    Assessment/Plan   Education and counseling provided:  Are appropriate based on today's review and evaluation  End-of-Life planning (with patient's consent)  Screening Mammography  Cardiovascular screening blood test  Bone mass measurement (DEXA)  Screening for glaucoma  Diabetes screening test    Diagnoses and all orders for this visit:    1. Medicare annual wellness visit, subsequent    2. Left lower quadrant abdominal pain  -     AMB POC URINALYSIS DIP STICK AUTO W/O MICRO    3. SSS (sick sinus syndrome) ([facility]    4. Advanced directives, counseling/discussion    5.  Pyuria  -     CULTURE, URINE        Health Maintenance Due   Topic Date Due    GLAUCOMA SCREENING Q2Y  05/23/2019    Medicare Yearly Exam  02/29/2020

## 2020-02-11 NOTE — PATIENT INSTRUCTIONS
Medicare Wellness Visit, Female The best way to live healthy is to have a lifestyle where you eat a well-balanced diet, exercise regularly, limit alcohol use, and quit all forms of tobacco/nicotine, if applicable. Regular preventive services are another way to keep healthy. Preventive services (vaccines, screening tests, monitoring & exams) can help personalize your care plan, which helps you manage your own care. Screening tests can find health problems at the earliest stages, when they are easiest to treat. [facility] follows the current, evidence-based guidelines published by the [facility] Nicolas Schmitt ([facility]) when recommending preventive services for our patients. Because we follow these guidelines, sometimes recommendations change over time as research supports it. (For example, mammograms used to be recommended annually. Even though Medicare will still pay for an annual mammogram, the newer guidelines recommend a mammogram every two years for women of average risk). Of course, you and your doctor may decide to screen more often for some diseases, based on your risk and your co-morbidities (chronic disease you are already diagnosed with). Preventive services for you include: - Medicare offers their members a free annual wellness visit, which is time for you and your primary care provider to discuss and plan for your preventive service needs. Take advantage of this benefit every year! 
-All adults over the age of 72 should receive the recommended pneumonia vaccines. Current USPSTF guidelines recommend a series of two vaccines for the best pneumonia protection.  
-All adults should have a flu vaccine yearly and a tetanus vaccine every 10 years.  
-All adults age 48 and older should receive the shingles vaccines (series of two vaccines). -All adults age 38-68 who are overweight should have a diabetes screening test once every three years. -All adults born between [birth year] and 1965 should be screened once for Hepatitis C. 
-Other screening tests and preventive services for persons with diabetes include: an eye exam to screen for diabetic retinopathy, a kidney function test, a foot exam, and stricter control over your cholesterol.  
-Cardiovascular screening for adults with routine risk involves an electrocardiogram (ECG) at intervals determined by your doctor.  
-Colorectal cancer screenings should be done for adults age 54-65 with no increased risk factors for colorectal cancer. There are a number of acceptable methods of screening for this type of cancer. Each test has its own benefits and drawbacks. Discuss with your doctor what is most appropriate for you during your annual wellness visit. The different tests include: colonoscopy (considered the best screening method), a fecal occult blood test, a fecal DNA test, and sigmoidoscopy. 
 
-A bone mass density test is recommended when a woman turns 65 to screen for osteoporosis. This test is only recommended one time, as a screening. Some providers will use this same test as a disease monitoring tool if you already have osteoporosis. -Breast cancer screenings are recommended every other year for women of normal risk, age 54-69. 
-Cervical cancer screenings for women over age 72 are only recommended with certain risk factors. Here is a list of your current Health Maintenance items (your personalized list of preventive services) with a due date: 
Health Maintenance Due Topic Date Due  Glaucoma Screening   05/23/2019 [address] [place] [address] Annual Well Visit  02/29/2020

## 2020-02-11 NOTE — PROGRESS NOTES
Maru Buchanan is a 80 y.o. female  HIPAA verified by two patient identifiers. Health Maintenance Due   Topic    Shingrix Vaccine Age 49> (1 of 2)    Pneumococcal 65+ years (1 of 1 - PPSV23)    GLAUCOMA SCREENING Q2Y     Influenza Age 5 to Adult     Medicare Yearly Exam      Chief Complaint   Patient presents with   Jacqui [place] Annual Wellness Visit     Visit Vitals  /70 (BP 1 Location: Right arm, BP Patient Position: Sitting)   Pulse 67   Temp 98.1 °F (36.7 °C) (Oral)   Resp 18   Ht 5' 4.5\" (1.638 m)   Wt 172 lb 8 oz (78.2 kg)   LMP  (LMP Unknown)   SpO2 94%   BMI 29.15 kg/m²       Pain Scale: 5/10  Pain Location: Abdomen (urinary,thigh and lower back)  1. Have you been to the ER, urgent care clinic since your last visit? Hospitalized since your last visit? No    2. Have you seen or consulted any other health care providers outside of the [address] [place] [address] since your last visit? Include any pap smears or colon screening.  No

## 2020-02-13 LAB — BACTERIA UR CULT: NORMAL

## 2020-03-02 DIAGNOSIS — I49.3 PVCS (PREMATURE VENTRICULAR CONTRACTIONS): ICD-10-CM

## 2020-03-02 DIAGNOSIS — I44.0 HEART BLOCK AV FIRST DEGREE: ICD-10-CM

## 2020-03-02 RX ORDER — LOSARTAN POTASSIUM 50 MG/1
50 TABLET ORAL DAILY
Qty: 30 TAB | Refills: 0 | Status: SHIPPED | OUTPATIENT
Start: 2020-03-02 | End: 2020-05-26

## 2020-03-02 NOTE — TELEPHONE ENCOUNTER
Requested Prescriptions     Signed Prescriptions Disp Refills    losartan (COZAAR) 50 mg tablet 30 Tab 0     Sig: Take 1 Tab by mouth daily.  PATIENT NEEDS TO MAKE AN OFFICE APPOINTMENT BEFORE ANY FURTHER REFILLS ARE GIVEN     Authorizing Provider: Mellissa Raman     Ordering User: Arun Miguel    Per Dr. Geo Howard verbal order     PATIENT NEEDS TO MAKE AN OFFICE APPOINTMENT BEFORE ANY FURTHER REFILLS ARE GIVEN

## 2020-03-25 ENCOUNTER — TELEPHONE (OUTPATIENT)
Dept: FAMILY MEDICINE CLINIC | Age: 85
End: 2020-03-25

## 2020-03-25 NOTE — TELEPHONE ENCOUNTER
Pt advised of negative culture results. No urinary sxs. Still having some d/c from pessary. Has contacted NP and has f/u with them in a month.

## 2020-03-25 NOTE — TELEPHONE ENCOUNTER
----- Message from Juan López MD sent at 2/13/2020  9:48 AM EST -----  Neg UC. Rec f/u with urol for current symptoms.

## 2020-03-25 NOTE — TELEPHONE ENCOUNTER
----- Message from Caryl Desai MD sent at 2/13/2020  9:48 AM EST -----  Neg UC. Rec f/u with urol for current symptoms.

## 2020-05-23 DIAGNOSIS — I44.0 HEART BLOCK AV FIRST DEGREE: ICD-10-CM

## 2020-05-23 DIAGNOSIS — I49.3 PVCS (PREMATURE VENTRICULAR CONTRACTIONS): ICD-10-CM

## 2020-05-26 RX ORDER — LOSARTAN POTASSIUM 50 MG/1
50 TABLET ORAL DAILY
Qty: 30 TAB | Refills: 0 | Status: SHIPPED | OUTPATIENT
Start: 2020-05-26 | End: 2020-06-22

## 2020-05-26 NOTE — TELEPHONE ENCOUNTER
Requested Prescriptions     Signed Prescriptions Disp Refills    losartan (COZAAR) 50 mg tablet 30 Tab 0     Sig: Take 1 Tab by mouth daily.  Needs virtual visit or phone call visit for future refills     Authorizing Provider: Emely White     Ordering User: Marco Perrin    Per Dr. Krysta Moore verbal order

## 2020-06-19 DIAGNOSIS — I49.3 PVCS (PREMATURE VENTRICULAR CONTRACTIONS): ICD-10-CM

## 2020-06-19 DIAGNOSIS — I44.0 HEART BLOCK AV FIRST DEGREE: ICD-10-CM

## 2020-06-22 RX ORDER — LOSARTAN POTASSIUM 50 MG/1
50 TABLET ORAL DAILY
Qty: 30 TAB | Refills: 0 | Status: SHIPPED | OUTPATIENT
Start: 2020-06-22 | End: 2020-08-25

## 2020-06-22 NOTE — TELEPHONE ENCOUNTER
Requested Prescriptions     Signed Prescriptions Disp Refills    losartan (COZAAR) 50 mg tablet 30 Tab 0     Sig: TAKE 1 TAB BY MOUTH DAILY.  NEEDS VIRTUAL VISIT OR PHONE CALL VISIT FOR FUTURE REFILLS     Authorizing Provider: Love Rodriguez     Ordering User: April Sarmiento   Per Dr. Dafne Salinas verbal order

## 2020-08-03 ENCOUNTER — TELEPHONE (OUTPATIENT)
Dept: FAMILY MEDICINE CLINIC | Age: 85
End: 2020-08-03

## 2020-08-03 NOTE — TELEPHONE ENCOUNTER
The patient would like a lab appointment. The patient would like her lab slip mailed to her.  Please call [phone]

## 2020-08-04 DIAGNOSIS — E78.5 HYPERLIPIDEMIA, UNSPECIFIED HYPERLIPIDEMIA TYPE: ICD-10-CM

## 2020-08-04 DIAGNOSIS — Z00.00 LABORATORY EXAM ORDERED AS PART OF ROUTINE GENERAL MEDICAL EXAMINATION: Primary | ICD-10-CM

## 2020-08-04 DIAGNOSIS — Z00.00 LABORATORY EXAM ORDERED AS PART OF ROUTINE GENERAL MEDICAL EXAMINATION: ICD-10-CM

## 2020-09-11 LAB
ALBUMIN SERPL-MCNC: 3.3 G/DL (ref 3.5–5)
ALBUMIN/GLOB SERPL: 0.9 {RATIO} (ref 1.1–2.2)
ALP SERPL-CCNC: 97 U/L (ref 45–117)
ALT SERPL-CCNC: 12 U/L (ref 12–78)
AMORPH CRY URNS QL MICRO: ABNORMAL
ANION GAP SERPL CALC-SCNC: 8 MMOL/L (ref 5–15)
APPEARANCE UR: ABNORMAL
AST SERPL-CCNC: 15 U/L (ref 15–37)
BACTERIA URNS QL MICRO: ABNORMAL /HPF
BASOPHILS # BLD: 0.1 K/UL (ref 0–0.1)
BASOPHILS NFR BLD: 1 % (ref 0–1)
BILIRUB SERPL-MCNC: 0.3 MG/DL (ref 0.2–1)
BILIRUB UR QL: NEGATIVE
BUN SERPL-MCNC: 13 MG/DL (ref 6–20)
BUN/CREAT SERPL: 21 (ref 12–20)
CALCIUM SERPL-MCNC: 9.1 MG/DL (ref 8.5–10.1)
CHLORIDE SERPL-SCNC: 104 MMOL/L (ref 97–108)
CHOLEST SERPL-MCNC: 258 MG/DL
CO2 SERPL-SCNC: 31 MMOL/L (ref 21–32)
COLOR UR: ABNORMAL
CREAT SERPL-MCNC: 0.62 MG/DL (ref 0.55–1.02)
DIFFERENTIAL METHOD BLD: ABNORMAL
EOSINOPHIL # BLD: 0.1 K/UL (ref 0–0.4)
EOSINOPHIL NFR BLD: 3 % (ref 0–7)
EPITH CASTS URNS QL MICRO: ABNORMAL /LPF
ERYTHROCYTE [DISTWIDTH] IN BLOOD BY AUTOMATED COUNT: 15.6 % (ref 11.5–14.5)
GLOBULIN SER CALC-MCNC: 3.6 G/DL (ref 2–4)
GLUCOSE SERPL-MCNC: 81 MG/DL (ref 65–100)
GLUCOSE UR STRIP.AUTO-MCNC: NEGATIVE MG/DL
HCT VFR BLD AUTO: 39.2 % (ref 35–47)
HDLC SERPL-MCNC: 61 MG/DL
HDLC SERPL: 4.2 {RATIO} (ref 0–5)
HGB BLD-MCNC: 12.1 G/DL (ref 11.5–16)
HGB UR QL STRIP: ABNORMAL
IMM GRANULOCYTES # BLD AUTO: 0 K/UL (ref 0–0.04)
IMM GRANULOCYTES NFR BLD AUTO: 0 % (ref 0–0.5)
KETONES UR QL STRIP.AUTO: NEGATIVE MG/DL
LDLC SERPL CALC-MCNC: 182.2 MG/DL (ref 0–100)
LEUKOCYTE ESTERASE UR QL STRIP.AUTO: ABNORMAL
LIPID PROFILE,FLP: ABNORMAL
LYMPHOCYTES # BLD: 2 K/UL (ref 0.8–3.5)
LYMPHOCYTES NFR BLD: 45 % (ref 12–49)
MCH RBC QN AUTO: 26.1 PG (ref 26–34)
MCHC RBC AUTO-ENTMCNC: 30.9 G/DL (ref 30–36.5)
MCV RBC AUTO: 84.5 FL (ref 80–99)
MONOCYTES # BLD: 0.3 K/UL (ref 0–1)
MONOCYTES NFR BLD: 7 % (ref 5–13)
NEUTS SEG # BLD: 2 K/UL (ref 1.8–8)
NEUTS SEG NFR BLD: 44 % (ref 32–75)
NITRITE UR QL STRIP.AUTO: NEGATIVE
NRBC # BLD: 0 K/UL (ref 0–0.01)
NRBC BLD-RTO: 0 PER 100 WBC
PH UR STRIP: 5.5 [PH] (ref 5–8)
PLATELET # BLD AUTO: 224 K/UL (ref 150–400)
PMV BLD AUTO: 10.7 FL (ref 8.9–12.9)
POTASSIUM SERPL-SCNC: 3.6 MMOL/L (ref 3.5–5.1)
PROT SERPL-MCNC: 6.9 G/DL (ref 6.4–8.2)
PROT UR STRIP-MCNC: NEGATIVE MG/DL
RBC # BLD AUTO: 4.64 M/UL (ref 3.8–5.2)
RBC #/AREA URNS HPF: ABNORMAL /HPF (ref 0–5)
SODIUM SERPL-SCNC: 143 MMOL/L (ref 136–145)
SP GR UR REFRACTOMETRY: 1.01 (ref 1–1.03)
TRIGL SERPL-MCNC: 74 MG/DL (ref ?–150)
TSH SERPL DL<=0.05 MIU/L-ACNC: 3.59 UIU/ML (ref 0.36–3.74)
UROBILINOGEN UR QL STRIP.AUTO: 1 EU/DL (ref 0.2–1)
VLDLC SERPL CALC-MCNC: 14.8 MG/DL
WBC # BLD AUTO: 4.4 K/UL (ref 3.6–11)
WBC URNS QL MICRO: ABNORMAL /HPF (ref 0–4)

## 2020-10-16 DIAGNOSIS — I44.0 HEART BLOCK AV FIRST DEGREE: ICD-10-CM

## 2020-10-16 DIAGNOSIS — I49.3 PVCS (PREMATURE VENTRICULAR CONTRACTIONS): ICD-10-CM

## 2020-10-16 RX ORDER — LOSARTAN POTASSIUM 50 MG/1
50 TABLET ORAL DAILY
Qty: 15 TAB | Refills: 0 | Status: SHIPPED | OUTPATIENT
Start: 2020-10-16 | End: 2020-11-03 | Stop reason: SDUPTHER

## 2020-10-16 NOTE — TELEPHONE ENCOUNTER
Requested Prescriptions     Signed Prescriptions Disp Refills    losartan (COZAAR) 50 mg tablet 15 Tab 0     Sig: Take 1 Tab by mouth daily.  Needs virtual visit or phone call visit for future refills     Authorizing Provider: Ching Maurice     Ordering User: Flavia Guo    Per Dr. Nupur Willis verbal order     Note made: needs VV or OV for further refills

## 2020-10-23 DIAGNOSIS — I49.3 PVCS (PREMATURE VENTRICULAR CONTRACTIONS): ICD-10-CM

## 2020-10-23 DIAGNOSIS — I44.0 HEART BLOCK AV FIRST DEGREE: ICD-10-CM

## 2020-10-23 RX ORDER — LOSARTAN POTASSIUM 50 MG/1
50 TABLET ORAL DAILY
Qty: 15 TAB | Refills: 0 | OUTPATIENT
Start: 2020-10-23

## 2020-11-03 ENCOUNTER — TELEPHONE (OUTPATIENT)
Dept: CARDIOLOGY CLINIC | Age: 85
End: 2020-11-03

## 2020-11-03 DIAGNOSIS — I44.0 HEART BLOCK AV FIRST DEGREE: ICD-10-CM

## 2020-11-03 DIAGNOSIS — I49.3 PVCS (PREMATURE VENTRICULAR CONTRACTIONS): ICD-10-CM

## 2020-11-03 RX ORDER — LOSARTAN POTASSIUM 50 MG/1
50 TABLET ORAL DAILY
Qty: 30 TAB | Refills: 1 | Status: SHIPPED | OUTPATIENT
Start: 2020-11-03 | End: 2020-12-03 | Stop reason: SDUPTHER

## 2020-11-03 NOTE — TELEPHONE ENCOUNTER
Requested Prescriptions     Signed Prescriptions Disp Refills    losartan (COZAAR) 50 mg tablet 30 Tab 1     Sig: Take 1 Tab by mouth daily.  Needs virtual visit or phone call visit for future refills     Authorizing Provider: Li Corona     Ordering User: Fernando Steward    Per Dr. Farhana Fields verbal order

## 2020-11-03 NOTE — TELEPHONE ENCOUNTER
Patient has scheduled an annual visi9t with Dr. Adolfo Coyle and would like to know if she can have a new prescription for her losartan. Patient would like this sent to the [facility] on [address] at [phone].  Thanks

## 2020-11-23 ENCOUNTER — VIRTUAL VISIT (OUTPATIENT)
Dept: FAMILY MEDICINE CLINIC | Age: 85
End: 2020-11-23
Payer: MEDICARE

## 2020-11-23 DIAGNOSIS — G89.29 CHRONIC HIP PAIN, LEFT: Primary | ICD-10-CM

## 2020-11-23 DIAGNOSIS — M25.552 CHRONIC HIP PAIN, LEFT: Primary | ICD-10-CM

## 2020-11-23 PROCEDURE — 99442 PR PHYS/QHP TELEPHONE EVALUATION 11-20 MIN: CPT | Performed by: FAMILY MEDICINE

## 2020-11-23 RX ORDER — ESTRADIOL 0.1 MG/G
CREAM VAGINAL
COMMUNITY
Start: 2020-10-12

## 2020-11-23 NOTE — PROGRESS NOTES
Delia Lind is a 80 y.o. female, evaluated via audio-only technology on 11/23/2020 for Hip Pain (5/10, for one month (left side))  . Assessment & Plan:   Diagnoses and all orders for this visit:    1. Chronic hip pain, left  -     REFERRAL TO ORTHOPEDICS        12  Subjective:     Left hip pain past month. Previous episode 5-6 yrs ago. Notes in AM.  Not every day. Advil helps. Rec try tylenol. Prior to Admission medications    Medication Sig Start Date End Date Taking? Authorizing Provider   estradioL (ESTRACE) 0.01 % (0.1 mg/gram) vaginal cream APPLY FINGERTIP SIZE AMOUNT TO VAGINA 3 TIMES/WEEK AT BEDTIME 10/12/20  Yes Provider, Historical   losartan (COZAAR) 50 mg tablet Take 1 Tab by mouth daily. Needs virtual visit or phone call visit for future refills 11/3/20  Yes Yee Raymond MD   trospium (SANCTURA XL) 60 mg capsule TAKE 1 CAPSULE BY MOUTH EVERY DAY 6/19/19  Yes Provider, Historical   CHOLECALCIFEROL, VITAMIN D3, (VITAMIN D3 PO) Take 1 Tab by mouth daily. Yes Provider, Historical   peg 400-propylene glycol (SYSTANE) 0.4-0.3 % drop Administer 1 Drop to both eyes nightly. Yes Provider, Historical   fexofenadine (ALLEGRA) 180 mg tablet Take 180 mg by mouth daily. Yes Provider, Historical   benzonatate (TESSALON PERLES) 100 mg capsule Take 1 Cap by mouth three (3) times daily as needed for Cough.  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 Needs virtual visit or phone call visit for future refills 30 Tab 1    trospium (SANCTURA XL) 60 mg capsule TAKE 1 CAPSULE BY MOUTH EVERY DAY  10    CHOLECALCIFEROL, VITAMIN D3, (VITAMIN D3 PO) Take 1 Tab by mouth daily.  peg 400-propylene glycol (SYSTANE) 0.4-0.3 % drop Administer 1 Drop to both eyes nightly.  fexofenadine (ALLEGRA) 180 mg tablet Take 180 mg by mouth daily.  benzonatate (TESSALON PERLES) 100 mg capsule Take 1 Cap by mouth three (3) times daily as needed for Cough.  20 Cap 0     Allergies   Allergen Reactions    Caffeine Other (comments)     insomnia    Lisinopril-Hydrochlorothiazide Rash     Past Medical History:   Diagnosis Date    Advance directive discussed with patient 06/22/2016    Allergic rhinitis     Bone spur 09/2013    Rt shoulder, Dr. Declan Barnesaria impaction 12/20/15    [place] [facility] notes    Chronic pain     left hip pain in the am    Cystocele 10/20/14    with nocturia [facility] notes rec'd     DDD (degenerative disc disease)     Elevated lipids     Essential hypertension     Hematuria 10/2008    renal cyst found on ultrasound but acute cystitis [facility]    Jaw fracture ([address] 11/2017    Laceration of foot 12/23/2016    flap type-sutures not done plain Td given    Left sided lacunar infarction ([address] 12/28/15    found on CT scan of head-old issue    Macular degeneration     VEI dr Peter Bull    OM (otitis media), acute 6/4/15    [facility] and 10/14/15 ENT Cynthia Drone    Prolapse of female bladder, acquired 2012    Was treated with PT    Screening for glaucoma 02/27/2017 & 5/23/17    [place] [facility]    Swelling of limb, right     Thyroid nodule [id number]    colloid nodule  collette mousalaline    Tinnitus 1/6/16     [facility]ian[facility] Artis -neck mass being monitored    Urinary urgency 2015          3/9/15    Physical Therapy thru [facility] notes rec'd    Vitamin D deficiency 10/2013     Past Surgical History:   Procedure Laterality Date    BREAST SURGERY PROCEDURE UNLISTED  1980    cyst right breast    ECHOCARDIOGRAM  1/2004    mild LVH on echo    ENDOSCOPY, COLON, DIAGNOSTIC  2006    thru [facility]    HX CATARACT REMOVAL      left    HX CHOLECYSTECTOMY      HX GYN  2006    D and C    HX HEENT  4/2010    right cataract    HX OTHER SURGICAL  1/2009    foreign body granuloma dr Ricarda Swanson Left 2/1/16    carotid dopplers done/old cva    HX UROLOGICAL  05/2015    pt states she has a prolapsed bladder but did not have any surgeries. she does kegel exercies      Family History   Problem Relation Age of Onset   [facility] Cancer Sister         lung    Hypertension Sister     Stroke Brother     Heart Disease Brother     Cancer Sister         breast    Cancer Father         brain tumor     Social History     Tobacco Use    Smoking status: Never Smoker    Smokeless tobacco: Never Used   Substance Use Topics    Alcohol use: Yes     Comment: social       ROS    Patient-Reported Vitals 11/23/2020   Patient-Reported Weight 172lb   Patient-Reported Temperature 98.1        Virgiesean Linita Lizandro, who was evaluated through a patient-initiated, synchronous (real-time) audio only encounter, and/or her healthcare decision maker, is aware that it is a billable service, with coverage as determined by her insurance carrier. She provided verbal consent to proceed: Yes. She has not had a related appointment within my department in the past 7 days or scheduled within the next 24 hours.       Total Time: minutes: 11-20 minutes    Mariellen Eisenmenger, MD

## 2020-11-23 NOTE — PROGRESS NOTES
Armani Shetty is a 80 y.o. female  HIPAA verified by two patient identifiers. Health Maintenance Due   Topic    Pneumococcal 65+ years (1 of 1 - PPSV23)    GLAUCOMA SCREENING Q2Y     Flu Vaccine (1)     Chief Complaint   Patient presents with    Hip Pain     5/10, for one month (left side)     Patient-Reported Vitals 11/23/2020   Patient-Reported Weight 172lb   Patient-Reported Temperature 98.1       Pain Scale:5 /10  Pain Location: Left hip            1. Have you been to the ER, urgent care clinic since your last visit? Hospitalized since your last visit? No    2. Have you seen or consulted any other health care providers outside of the [address] [place] [address] since your last visit? Include any pap smears or colon screening.  No    Phone call only # [phone]

## 2020-11-24 ENCOUNTER — TELEPHONE (OUTPATIENT)
Dept: FAMILY MEDICINE CLINIC | Age: 85
End: 2020-11-24

## 2020-12-03 DIAGNOSIS — I49.3 PVCS (PREMATURE VENTRICULAR CONTRACTIONS): ICD-10-CM

## 2020-12-03 DIAGNOSIS — I44.0 HEART BLOCK AV FIRST DEGREE: ICD-10-CM

## 2020-12-03 RX ORDER — LOSARTAN POTASSIUM 50 MG/1
50 TABLET ORAL DAILY
Qty: 30 TAB | Refills: 0 | Status: SHIPPED | OUTPATIENT
Start: 2020-12-03 | End: 2020-12-09 | Stop reason: SDUPTHER

## 2020-12-03 NOTE — TELEPHONE ENCOUNTER
Requested Prescriptions     Signed Prescriptions Disp Refills    losartan (COZAAR) 50 mg tablet 30 Tab 0     Sig: Take 1 Tab by mouth daily.      Authorizing Provider: Tavo Grier     Ordering User: Imer Hernandez    Per Dr. Brien Mary verbal order

## 2020-12-09 ENCOUNTER — OFFICE VISIT (OUTPATIENT)
Dept: CARDIOLOGY CLINIC | Age: 85
End: 2020-12-09
Payer: MEDICARE

## 2020-12-09 VITALS
DIASTOLIC BLOOD PRESSURE: 84 MMHG | HEIGHT: 65 IN | BODY MASS INDEX: 29.32 KG/M2 | WEIGHT: 176 LBS | SYSTOLIC BLOOD PRESSURE: 140 MMHG | RESPIRATION RATE: 16 BRPM | HEART RATE: 63 BPM | OXYGEN SATURATION: 98 %

## 2020-12-09 DIAGNOSIS — I49.3 PVCS (PREMATURE VENTRICULAR CONTRACTIONS): ICD-10-CM

## 2020-12-09 DIAGNOSIS — I65.23 CAROTID ARTERY PLAQUE, BILATERAL: ICD-10-CM

## 2020-12-09 DIAGNOSIS — I10 ESSENTIAL HYPERTENSION: Primary | ICD-10-CM

## 2020-12-09 DIAGNOSIS — I44.0 HEART BLOCK AV FIRST DEGREE: ICD-10-CM

## 2020-12-09 DIAGNOSIS — I49.5 SSS (SICK SINUS SYNDROME) (HCC): ICD-10-CM

## 2020-12-09 DIAGNOSIS — I69.398 CVA, OLD, DISTURBANCES OF VISION: ICD-10-CM

## 2020-12-09 DIAGNOSIS — H53.9 CVA, OLD, DISTURBANCES OF VISION: ICD-10-CM

## 2020-12-09 PROCEDURE — G0463 HOSPITAL OUTPT CLINIC VISIT: HCPCS | Performed by: SPECIALIST

## 2020-12-09 PROCEDURE — G8427 DOCREV CUR MEDS BY ELIG CLIN: HCPCS | Performed by: SPECIALIST

## 2020-12-09 PROCEDURE — G8536 NO DOC ELDER MAL SCRN: HCPCS | Performed by: SPECIALIST

## 2020-12-09 PROCEDURE — G8419 CALC BMI OUT NRM PARAM NOF/U: HCPCS | Performed by: SPECIALIST

## 2020-12-09 PROCEDURE — 1101F PT FALLS ASSESS-DOCD LE1/YR: CPT | Performed by: SPECIALIST

## 2020-12-09 PROCEDURE — 99214 OFFICE O/P EST MOD 30 MIN: CPT | Performed by: SPECIALIST

## 2020-12-09 PROCEDURE — 1090F PRES/ABSN URINE INCON ASSESS: CPT | Performed by: SPECIALIST

## 2020-12-09 PROCEDURE — G8510 SCR DEP NEG, NO PLAN REQD: HCPCS | Performed by: SPECIALIST

## 2020-12-09 RX ORDER — LOSARTAN POTASSIUM 50 MG/1
50 TABLET ORAL DAILY
Qty: 90 TAB | Refills: 3 | Status: SHIPPED | OUTPATIENT
Start: 2020-12-09 | End: 2022-10-26 | Stop reason: SDUPTHER

## 2020-12-09 NOTE — PROGRESS NOTES
HISTORY OF PRESENT ILLNESS  Noemi Butler is a 80 y.o. female. She is seen in follow-up for hypertension and possible sick sinus syndrome. She has been bradycardic in the past but today her heart rate is 68 bpm.  She has had trouble getting refills on her losartan. She has no complaints. 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 90 Tab 3    estradioL (ESTRACE) 0.01 % (0.1 mg/gram) vaginal cream APPLY FINGERTIP SIZE AMOUNT TO VAGINA 3 TIMES/WEEK AT BEDTIME      trospium (SANCTURA XL) 60 mg capsule TAKE 1 CAPSULE BY MOUTH EVERY DAY  10    CHOLECALCIFEROL, VITAMIN D3, (VITAMIN D3 PO) Take 1 Tab by mouth daily.  peg 400-propylene glycol (SYSTANE) 0.4-0.3 % drop Administer 1 Drop to both eyes nightly.  fexofenadine (ALLEGRA) 180 mg tablet Take 180 mg by mouth daily.  benzonatate (TESSALON PERLES) 100 mg capsule Take 1 Cap by mouth three (3) times daily as needed for Cough.  20 Cap 0     Past Medical History:   Diagnosis Date    Advance directive discussed with patient 06/22/2016    Allergic rhinitis     Bone spur 09/2013    Rt shoulder, Dr. Grey Lights impaction 12/20/15    [facility]    Chronic pain     left hip pain in the am    Cystocele 10/20/14    with nocturia [facility] notes rec'd     DDD (degenerative disc disease)     Elevated lipids     Essential hypertension     Hematuria 10/2008    renal cyst found on ultrasound but acute cystitis [facility]    Jaw fracture ([address] 11/2017    Laceration of foot 12/23/2016    flap type-sutures not done plain Td given    Left sided lacunar infarction ([address] 12/28/15    found on CT scan of head-old issue    Macular degeneration     VEI dr Sloane Zamora    OM (otitis media), acute 6/4/15    [facility] 10/14/15 ENT Gurinder Hoop    Prolapse of female bladder, acquired 2012    Was treated with PT    Screening for glaucoma 02/27/2017 & 5/23/17    [place] [facility]    Swelling of limb, right     Thyroid nodule [id number]    colloid nodule dr Jack Blank    Tinnitus 1/6/16     [facility] [place] -neck mass being monitored    Urinary urgency 2015          3/9/15    Physical Therapy thru [facility] rec'd    Vitamin D deficiency 10/2013     Past Surgical History:   Procedure Laterality Date    BREAST SURGERY PROCEDURE UNLISTED  1980    cyst right breast    ECHOCARDIOGRAM  1/2004    mild LVH on echo    ENDOSCOPY, COLON, DIAGNOSTIC  2006    thru [facility]    HX CATARACT REMOVAL      left    HX CHOLECYSTECTOMY      HX GYN  2006    D and C    HX HEENT  4/2010    right cataract    HX OTHER SURGICAL  1/2009    foreign body granuloma dr Coronado Pore Left 2/1/16    carotid dopplers done/old cva    HX UROLOGICAL  05/2015    pt states she has a prolapsed bladder but did not have any surgeries. she does kegel exercies        Review of Systems   Musculoskeletal: Positive for back pain. All other systems reviewed and are negative. Visit Vitals  BP (!) 140/84 (BP 1 Location: Left arm, BP Patient Position: Sitting)   Pulse 63   Resp 16   Ht 5' 4.5\" (1.638 m)   Wt 176 lb (79.8 kg)   LMP  (LMP Unknown)   SpO2 98%   BMI 29.74 kg/m²       Physical Exam   Constitutional: She is oriented to person, place, and time. She appears well-nourished. HENT:   Head: Atraumatic. Eyes: Conjunctivae are normal.   Neck: Neck supple. Cardiovascular: Normal rate, regular rhythm and normal heart sounds. Exam reveals no gallop and no friction rub. No murmur heard. Pulmonary/Chest: Breath sounds normal. She has no wheezes. She has no rales. Abdominal: Bowel sounds are normal.   Musculoskeletal:         General: No edema. Neurological: She is oriented to person, place, and time. Skin: Skin is dry. Psychiatric: Her behavior is normal.   Nursing note and vitals reviewed. ASSESSMENT and PLAN  I will refill her losartan today and see her back in 1 year. She is having occasional pain in her left buttocks which I suspect is due to lumbar spine disease.

## 2020-12-22 ENCOUNTER — TELEPHONE (OUTPATIENT)
Dept: FAMILY MEDICINE CLINIC | Age: 85
End: 2020-12-22

## 2020-12-22 NOTE — TELEPHONE ENCOUNTER
Called pt to see how she was doing lvm stating that I received a call from Dr. Jie Dolan stating that they were concerned about her health and that if she has any signs or sx related to stroke and due to risk factors that I highly advise her to got to the ER,  Informed pt to call back . Chaitanya Brenner      Call from nurse different enc documenting in this note        recived a call from Dr. Jie Dolan office that she may have a possible TIA and is refusing to go to the hospital

## 2021-08-02 ENCOUNTER — OFFICE VISIT (OUTPATIENT)
Dept: INTERNAL MEDICINE CLINIC | Age: 86
End: 2021-08-02
Payer: MEDICARE

## 2021-08-02 VITALS
DIASTOLIC BLOOD PRESSURE: 74 MMHG | SYSTOLIC BLOOD PRESSURE: 132 MMHG | WEIGHT: 174.9 LBS | RESPIRATION RATE: 16 BRPM | OXYGEN SATURATION: 99 % | BODY MASS INDEX: 29.14 KG/M2 | TEMPERATURE: 97.1 F | HEART RATE: 62 BPM | HEIGHT: 65 IN

## 2021-08-02 DIAGNOSIS — I10 ESSENTIAL HYPERTENSION: ICD-10-CM

## 2021-08-02 DIAGNOSIS — E55.9 VITAMIN D DEFICIENCY: ICD-10-CM

## 2021-08-02 DIAGNOSIS — K59.00 CONSTIPATION, UNSPECIFIED CONSTIPATION TYPE: ICD-10-CM

## 2021-08-02 DIAGNOSIS — H91.93 DECREASED HEARING OF BOTH EARS: ICD-10-CM

## 2021-08-02 DIAGNOSIS — R73.03 PREDIABETES: ICD-10-CM

## 2021-08-02 DIAGNOSIS — H61.22 LEFT EAR IMPACTED CERUMEN: ICD-10-CM

## 2021-08-02 DIAGNOSIS — Z00.00 MEDICARE ANNUAL WELLNESS VISIT, SUBSEQUENT: Primary | ICD-10-CM

## 2021-08-02 DIAGNOSIS — R60.0 LOCALIZED EDEMA: ICD-10-CM

## 2021-08-02 DIAGNOSIS — N81.10 FEMALE BLADDER PROLAPSE: ICD-10-CM

## 2021-08-02 DIAGNOSIS — Z86.73 HISTORY OF CVA (CEREBROVASCULAR ACCIDENT): ICD-10-CM

## 2021-08-02 DIAGNOSIS — I49.5 SSS (SICK SINUS SYNDROME) (HCC): ICD-10-CM

## 2021-08-02 DIAGNOSIS — Z71.89 ACP (ADVANCE CARE PLANNING): ICD-10-CM

## 2021-08-02 DIAGNOSIS — G45.9 TIA (TRANSIENT ISCHEMIC ATTACK): ICD-10-CM

## 2021-08-02 DIAGNOSIS — E78.2 MIXED HYPERLIPIDEMIA: ICD-10-CM

## 2021-08-02 PROCEDURE — 1090F PRES/ABSN URINE INCON ASSESS: CPT | Performed by: PHYSICIAN ASSISTANT

## 2021-08-02 PROCEDURE — G8419 CALC BMI OUT NRM PARAM NOF/U: HCPCS | Performed by: PHYSICIAN ASSISTANT

## 2021-08-02 PROCEDURE — 99214 OFFICE O/P EST MOD 30 MIN: CPT | Performed by: PHYSICIAN ASSISTANT

## 2021-08-02 PROCEDURE — G8427 DOCREV CUR MEDS BY ELIG CLIN: HCPCS | Performed by: PHYSICIAN ASSISTANT

## 2021-08-02 PROCEDURE — 1101F PT FALLS ASSESS-DOCD LE1/YR: CPT | Performed by: PHYSICIAN ASSISTANT

## 2021-08-02 PROCEDURE — G8536 NO DOC ELDER MAL SCRN: HCPCS | Performed by: PHYSICIAN ASSISTANT

## 2021-08-02 PROCEDURE — G0439 PPPS, SUBSEQ VISIT: HCPCS | Performed by: PHYSICIAN ASSISTANT

## 2021-08-02 PROCEDURE — G8432 DEP SCR NOT DOC, RNG: HCPCS | Performed by: PHYSICIAN ASSISTANT

## 2021-08-02 NOTE — ACP (ADVANCE CARE PLANNING)
Advance Care Planning (ACP) Provider Conversation Snapshot    Date of ACP Conversation: 08/02/21  Persons included in Conversation:  patient  Length of ACP Conversation in minutes:  <16 minutes (Non-Billable)    Authorized Decision Maker (if patient is incapable of making informed decisions):    This person is:   Next of Kin by law (only applies in absence of a Healthcare Power of  or Legal Guardian)  DaughterDonna Diss          For Patients with Decision Making Capacity:   Values/Goals: Exploration of values, goals, and preferences if recovery is not expected, even with continued medical treatment in the event of:  Imminent death  Severe, permanent brain injury    Conversation Outcomes / Follow-Up Plan:   ACP incomplete - refer to ACP Clinical Specialist

## 2021-08-02 NOTE — PROGRESS NOTES
Tony Garrison is a 80 y.o. female     Chief Complaint   Patient presents with    [facility]     new patient       Visit Vitals  /74 (BP 1 Location: Left arm, BP Patient Position: Sitting, BP Cuff Size: Adult)   Pulse 62   Temp 97.1 °F (36.2 °C) (Oral)   Resp 16   Ht 5' 4.5\" (1.638 m)   Wt 174 lb 14.4 oz (79.3 kg)   LMP  (LMP Unknown)   SpO2 99%   BMI 29.56 kg/m²       Health Maintenance Due   Topic Date Due    COVID-19 Vaccine (1) Never done    Shingrix Vaccine Age 50> (1 of 2) Never done    Pneumococcal 65+ years (1 of 1 - PPSV23) Never done    Medicare Yearly Exam  02/11/2021       1. Have you been to the ER, urgent care clinic since your last visit? Hospitalized since your last visit? No    2. Have you seen or consulted any other health care providers outside of the [address] [place] [address] since your last visit? Include any pap smears or colon screening. No    No recent immunizations other than covid vaccine. Patient does not have her covid card with her. Patient doesn't use her MyChart.

## 2021-08-02 NOTE — PROGRESS NOTES
This is a Subsequent Medicare Annual Wellness Exam (AWV) (Performed 12 months after IPPE or effective date of Medicare Part B enrollment)    I have reviewed the patient's medical history in detail and updated the computerized patient record.      History     Past Medical History:   Diagnosis Date    Advance directive discussed with patient 06/22/2016    Allergic rhinitis     Bone spur 09/2013    Rt shoulder, Dr. Sathish Lyle impaction 12/20/15    [place] [facility] notes    Chronic pain     left hip pain in the am    Cystocele 10/20/14    with nocturia [facility] rec'd     DDD (degenerative disc disease)     Elevated lipids     Essential hypertension     Hematuria 10/2008    renal cyst found on ultrasound but acute cystitis [facility]    Jaw fracture ([address] 11/2017    Laceration of foot 12/23/2016    flap type-sutures not done plain Td given    Left sided lacunar infarction ([address] 12/28/15    found on CT scan of head-old issue    Macular degeneration     VEI dr Alex Conroy    OM (otitis media), acute 6/4/15    [facility] and 10/14/15 ENT Claudia Chen    Prolapse of female bladder, acquired 2012    Was treated with PT    Screening for glaucoma 02/27/2017 & 5/23/17    [place] [facility]    Swelling of limb, right     Thyroid nodule [id number]    colloid nodule dr Rachel Bryan    Tinnitus 1/6/16     [facility] -neck mass being monitored    Urinary urgency 2015          3/9/15    Physical Therapy thru [facility] rec'd    Vitamin D deficiency 10/2013      Past Surgical History:   Procedure Laterality Date    ECHOCARDIOGRAM  1/2004    mild LVH on echo    ENDOSCOPY, COLON, DIAGNOSTIC  2006    thru [facility]    HX CATARACT REMOVAL      left    HX CHOLECYSTECTOMY      HX GYN  2006    D and C    HX HEENT  4/2010    right cataract    HX OTHER SURGICAL  1/2009    foreign body granuloma dr Chavez Sake Left 2/1/16    carotid dopplers done/old cva    HX UROLOGICAL  05/2015    pt states she has a prolapsed bladder but did not have any surgeries. she does kegel exercies     [place] BREAST SURGERY PROCEDURE UNLISTED  1980    cyst right breast     Current Outpatient Medications   Medication Sig Dispense Refill    losartan (COZAAR) 50 mg tablet Take 1 Tab by mouth daily. 90 Tab 3    estradioL (ESTRACE) 0.01 % (0.1 mg/gram) vaginal cream APPLY FINGERTIP SIZE AMOUNT TO VAGINA 3 TIMES/WEEK AT BEDTIME      trospium (SANCTURA XL) 60 mg capsule TAKE 1 CAPSULE BY MOUTH EVERY DAY  10    CHOLECALCIFEROL, VITAMIN D3, (VITAMIN D3 PO) Take 1 Tab by mouth daily.  peg 400-propylene glycol (SYSTANE) 0.4-0.3 % drop Administer 1 Drop to both eyes nightly.  fexofenadine (ALLEGRA) 180 mg tablet Take 180 mg by mouth daily.  benzonatate (TESSALON PERLES) 100 mg capsule Take 1 Cap by mouth three (3) times daily as needed for Cough.  (Patient not taking: Reported on 8/2/2021) 20 Cap 0     Allergies   Allergen Reactions    Lisinopril-Hydrochlorothiazide Rash    Caffeine Other (comments)     insomnia     Family History   Problem Relation Age of Onset   [employer] Cancer Sister         lung    Hypertension Sister     Stroke Brother     Heart Disease Brother    [employer] Cancer Sister         breast    Cancer Father         brain tumor     Social History     Tobacco Use    Smoking status: Never Smoker    Smokeless tobacco: Never Used   Substance Use Topics    Alcohol use: Yes     Comment: social     Patient Active Problem List    Diagnosis    PCO (posterior capsular opacification), bilateral    Macular degeneration (senile) of retina    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    Female bladder prolapse    Subretinal neovascularization of macula    Posterior vitreous detachment    Vitreous floaters    [facility]SS [facility]ick [facility] syndrome) ([facility]    BP (high blood pressure)    PVCs (premature ventricular contractions)    Heart block AV first degree    Vitamin D deficiency       Depression Risk Factor Screening:     3 most recent PHQ Screens 8/2/2021   Little interest or pleasure in doing things Not at all   Feeling down, depressed, irritable, or hopeless Not at all   Total Score PHQ 2 0     Alcohol Risk Factor Screening:   Do you average more than 1 drink per night or more than 7 drinks a week:  No    On any one occasion in the past three months have you have had more than 3 drinks containing alcohol:  No    Functional Ability and Level of Safety:   Hearing Loss  The patient needs further evaluation. Activities of Daily Living  The home contains: no safety equipment. Patient does total self care    Fall Risk  Fall Risk Assessment, last 12 mths 8/2/2021   Able to walk? Yes   Fall in past 12 months? 0   Do you feel unsteady? 0   Are you worried about falling 0   Number of falls in past 12 months -   Fall with injury? -       Abuse Screen  Patient is not abused    Cognitive Screening   Evaluation of Cognitive Function:  Has your family/caregiver stated any concerns about your memory: no      Patient Care Team   Patient Care Team:  Marlo Brady PA-C as PCP - General (Physician Assistant)  Marlo Brady PA-C as PCP - [facility]  Sigrid Chappell MD (Endocrinology)  Rosi Otto MD (Cardiology)  Betty Hairston MD (Otolaryngology)  Grant Onofre MD (Ophthalmology)    Assessment/Plan   Education and counseling provided:  Are appropriate based on today's review and evaluation  End-of-Life planning (with patient's consent)  Pneumococcal Vaccine dcelines  Influenza Vaccine has declined  Appropriate cancer screening tests    Diagnoses and all orders for this visit:    1. Medicare annual wellness visit, subsequent    2. ACP (advance care planning)  -     REFERRAL TO ACP CLINICAL SPECIALIST    3. Essential hypertension    4. SSS (sick sinus syndrome) ([facility]    5. Vitamin D deficiency    6. Female bladder prolapse    7. Decreased hearing of both ears  -     REFERRAL TO ENT-OTOLARYNGOLOGY    8. Left ear impacted cerumen  -     REFERRAL TO ENT-OTOLARYNGOLOGY    9. Constipation, unspecified constipation type    10. Localized edema    11. History of CVA (cerebrovascular accident)    15. TIA (transient ischemic attack)    13. Prediabetes    14. Mixed hyperlipidemia        Health Maintenance Due   Topic Date Due    Medicare Yearly Exam  02/11/2021         HPI:  Wyatt Blake. Warren Schwarz also presents to establish care. Former patient of Dr María Pagan, who is now retired. Records in CC. Last visit with Dr Juarez VV 11/23/2020 for chronic hip pain    Recap from 2/11/2020 visit with Dr María Pagan: Yanet Cleaves doctor q 3-4 mos for MD with shots. Dentist 2 x annually. Colonoscopy '75. Done. Mammo annually. Card annually. ENT q 4-5 mos. Was monthly. Right arm improved. Did PT. [facility]rol q 3 mos for bladder prolapse. Has pessary. No signif hx past yr. Not working on diet or exercise. Plans to walk more. Pain lower back and LLQ. Pain in AM resolves after urinates.        She is native [place], has lived in [place] for over 36 yrs  Briefly lived in [place] as well starting in her 21 yrs  She is , has 2 daughters ages 61 and 62  She previously taught social work at [employer], retired early in young 62s to care for her grandkids traveling to [place] every week for 10 years, then worked as  for 10 yrs in [place] [id number]  Is not as active in the community now that she iis older  She lives alone, her daughters live in [place], brothers in [place] and [place]    Cardiology Dr Jarod Saleh  Visits now annually, last visit 12/9/2020, note reviewed  HTN, SSS, first degree AV block, h/o PVCs  On losartan 50 mg  History of old CVA listed on her chart, on head CT 12/28/2015 noted old left sided infarct, 2/2016 normal carotid duplex done by Dr Jarod Saleh  -per Dr Saima Horoiwtz note 1/20/2017, she has refused to take a statin  -she was previously on  mg, but this was stopped 2/2019 and I can ont tell why    Seasonal allergies  Takes Allegra daily year round    Vitamin D Deficiency  Low in the past  Now taking D3 daily (she is unsure of the units)  Lab Results   Component Value Date/Time    Vitamin D 25-Hydroxy 18.5 (L) 05/25/2011 11:09 AM    VITAMIN D, 25-HYDROXY 40.4 06/11/2019 10:38 AM         Bladder prolapse  Sees urology q 3 mos  On trospium (Sanctura XL) 60 mg and Estrace vaginal cream 3x/week  Has pessary      Saw ENT Dr Marsha Soares  Had earache and dizziness in the past  She would see him q3mos to have \"something scraped off my left ear\" but states it was not wax buildup    TODAY -   C/O altered BM ever since she had cholecystectomy, complains of constipation with hard little balls of stool, may go 3 days between BM's  Has reviewed with Dr Elena Patel, she states she was told was constipated and needed \"more sweets\"  She will get a left side abdominal pain once in a while, episode about 1 weeks ago, prior episode was 5 years ago  She notices if she can create a soft stool and can evacuate a lot of stool, it relieves the pain  She states the pain is now resolved and her stool has been soft lately    After she left, 12/2020 [place] ER visit 12/22/20 note reviewed  Per discharge summary:  1. Neuro: Clinical presentation is consistent with TIA. MRI revealed no new stroke but rather old small vessel lacunar infarcts. MRA revealed moderately severe intracranial arteriosclerosis. I recommended that she start ASA 81 mg daily but she would prefer to discuss this with her cardiologist and her PCP. 2. Cardiac: Monitored for intermittent arrhythmia. None noted. TTE revealed no obvious cardioembolic source. 3. Hypertension: No further need for permissive hypertension. She can resume her outpatient medications. 4. Endocrine: TSH and Fasting Lipid Panel completed. Recommended LDL goal would be < 130. Recommend follow up with her PCP to consider the pros and cons of starting at statin medication. 5. Rehab: No needs.              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 ROS:  ROS negative except as per HPI. PE:  Visit Vitals  /74 (BP 1 Location: Left arm, BP Patient Position: Sitting, BP Cuff Size: Adult)   Pulse 62   Temp 97.1 °F (36.2 °C) (Oral)   Resp 16   Ht 5' 4.5\" (1.638 m)   Wt 174 lb 14.4 oz (79.3 kg)   LMP  (LMP Unknown)   SpO2 99%   BMI 29.56 kg/m²     Gen: alert, oriented, no acute distress  Head: normocephalic, atraumatic  Ears: R external auditory canal clear, R TMs without erythema or effusion, (+)left ear cerumen impaction  Eyes: pupils equal round reactive to light, sclera clear, conjunctiva clear  Oral: moist mucus membranes, no oral lesions, no pharyngeal inflammation or exudate  Neck: symmetric normal sized thyroid, no carotid bruits, no jugular vein distention  Resp: no increase work of breathing, lungs clear to ausculation bilaterally, no wheezing, rales or rhonchi  CV: S1, S2 normal.  No murmurs, rubs, or gallops. Abd: soft, not tender, not distended. No hepatosplenomegaly. Normal bowel sounds. Neuro: grossly normal  Skin: no lesion or rash  Extremities: bilateral nonpitting edema of feet, ankles, and lower tibia    No results found for this visit on 08/02/21.     Lab Results   Component Value Date/Time    WBC 4.4 09/11/2020 11:16 AM    HGB 12.1 09/11/2020 11:16 AM    HCT 39.2 09/11/2020 11:16 AM    PLATELET 306 32/28/3173 11:16 AM    MCV 84.5 09/11/2020 11:16 AM     Lab Results   Component Value Date/Time    Sodium 143 09/11/2020 11:16 AM    Potassium 3.6 09/11/2020 11:16 AM    Chloride 104 09/11/2020 11:16 AM    CO2 31 09/11/2020 11:16 AM    Anion gap 8 09/11/2020 11:16 AM    Glucose 81 09/11/2020 11:16 AM    BUN 13 09/11/2020 11:16 AM    Creatinine 0.62 09/11/2020 11:16 AM    BUN/Creatinine ratio 21 (H) 09/11/2020 11:16 AM    GFR est AA >60 09/11/2020 11:16 AM    GFR est non-AA >60 09/11/2020 11:16 AM    Calcium 9.1 09/11/2020 11:16 AM    Bilirubin, total 0.3 09/11/2020 11:16 AM    Alk. phosphatase 97 09/11/2020 11:16 AM    Protein, total 6.9 09/11/2020 11:16 AM    Albumin 3.3 (L) 09/11/2020 11:16 AM    Globulin 3.6 09/11/2020 11:16 AM    A-G Ratio 0.9 (L) 09/11/2020 11:16 AM    ALT (SGPT) 12 09/11/2020 11:16 AM    AST (SGOT) 15 09/11/2020 11:16 AM     Lab Results   Component Value Date/Time    Cholesterol, total 258 (H) 09/11/2020 11:16 AM    HDL Cholesterol 61 09/11/2020 11:16 AM    LDL, calculated 182.2 (H) 09/11/2020 11:16 AM    VLDL, calculated 14.8 09/11/2020 11:16 AM    Triglyceride 74 09/11/2020 11:16 AM    CHOL/HDL Ratio 4.2 09/11/2020 11:16 AM     Lab Results   Component Value Date/Time    TSH 3.59 09/11/2020 11:16 AM     Lab Results   Component Value Date/Time    Vitamin D 25-Hydroxy 18.5 (L) 05/25/2011 11:09 AM    VITAMIN D, 25-HYDROXY 40.4 06/11/2019 10:38 AM       No results found for: HBA1C, HCG2WUWT, UQU6AJAM    A1C 5.8% noted on Care Everywhere 12/2020    Assessment/Plan:    1. Medicare annual wellness visit, subsequent      2. ACP (advance care planning)    - REFERRAL TO ACP CLINICAL SPECIALIST    3. Essential hypertension  132/74, on losartan  Controlled  Followed by cards Dr Hilda Vega    4. SSS (sick sinus syndrome) ([facility]  Monitored by cards Dr Hilda Vega  Does not have pacemaker    5. Vitamin D deficiency  Low in the past  Now taking D3 daily (she is unsure of the units)  Most recent check normal    6.  Female bladder prolapse  Followed by urology  On trospium (Sanctura XL) 60 mg and Estrace vaginal cream 3x/week  Has pessary    7. Decreased hearing of both ears  Is interested in eval for hearing aids, but has been seeing ENT in the past, described possible cerumen removal on regular basis by ENT with cerumen present today  Refer for recheck and cerumen removal   - REFERRAL TO ENT-OTOLARYNGOLOGY    8. Left ear impacted cerumen    - REFERRAL TO ENT-OTOLARYNGOLOGY    9. Constipation, unspecified constipation type  Reviewed she may use miralax prn and titrate to have soft BM once daily    10. Localized edema  She reports this is chronic  Reviewed compression stocking  Low salt diet    11. History of CVA (cerebrovascular accident)  on head CT 12/28/2015 noted old left sided infarct, 2/2016 normal carotid duplex done by Dr Trenton Dc  -per Dr Rosemary Fiore note 1/20/2017, she has refused to take a statin  -she was previously on  mg, but this was stopped 2/2019 and I can not tell why    12. TIA (transient ischemic attack)  12/2020, evaluated at [facility]  She has declined ASA and statin in the past, will continue to review    13. Prediabetes  5.8% in labs at [facility]  Will need to review low carb diet    14. Mixed hyperlipidemia   9/2020  She has declined statin  Will need to review low fat diet      Orders Placed This Encounter    REFERRAL TO [facility] CLINICAL SPECIALIST     Referral Priority:   Routine     Referral Type: Other     Referral Reason:   Specialty Services Required     Number of Visits Requested:   1    [facility] [address] ENT [facility]     Referral Priority:   Routine     Referral Type:   Consultation     Referral Reason:   Specialty Services Required     Referred to Provider:   Misty Stinson MD     Number of Visits Requested:   1       There are no discontinued medications. Current Outpatient Medications   Medication Sig Dispense Refill    losartan (COZAAR) 50 mg tablet Take 1 Tab by mouth daily.  90 Tab 3    estradioL (ESTRACE) 0.01 % (0.1 mg/gram) vaginal cream APPLY FINGERTIP SIZE AMOUNT TO VAGINA 3 TIMES/WEEK AT BEDTIME      trospium (SANCTURA XL) 60 mg capsule TAKE 1 CAPSULE BY MOUTH EVERY DAY  10    CHOLECALCIFEROL, VITAMIN D3, (VITAMIN D3 PO) Take 1 Tab by mouth daily.  peg 400-propylene glycol (SYSTANE) 0.4-0.3 % drop Administer 1 Drop to both eyes nightly.  fexofenadine (ALLEGRA) 180 mg tablet Take 180 mg by mouth daily.  benzonatate (TESSALON PERLES) 100 mg capsule Take 1 Cap by mouth three (3) times daily as needed for Cough. (Patient not taking: Reported on 8/2/2021) 20 Cap 0       Recommended healthy diet low in carbohydrates, fats, sodium and cholesterol. Recommended regular cardiovascular exercise 3-6 times per week for 30-60 minutes daily. Verbal and written instructions (see AVS) provided. Patient expresses understanding of diagnosis and treatment plan. Follow-up and Dispositions    · Return in about 3 months (around 11/2/2021) for review prediabetes, lipids, prior TIA.        Future Appointments   Date Time Provider Loretta Pedro   11/4/2021 10:30 AM Vero Buckner PA-C [facility] AMB   12/8/2021 11:00 AM MD JAIMEE Baron AMB

## 2021-08-02 NOTE — PATIENT INSTRUCTIONS
Medicare Wellness Visit, Female     The best way to live healthy is to have a lifestyle where you eat a well-balanced diet, exercise regularly, limit alcohol use, and quit all forms of tobacco/nicotine, if applicable. Regular preventive services are another way to keep healthy. Preventive services (vaccines, screening tests, monitoring & exams) can help personalize your care plan, which helps you manage your own care. Screening tests can find health problems at the earliest stages, when they are easiest to treat. [facility] follows the current, evidence-based guidelines published by the [facility] ([facility]) when recommending preventive services for our patients. Because we follow these guidelines, sometimes recommendations change over time as research supports it. (For example, mammograms used to be recommended annually. Even though Medicare will still pay for an annual mammogram, the newer guidelines recommend a mammogram every two years for women of average risk). Of course, you and your doctor may decide to screen more often for some diseases, based on your risk and your co-morbidities (chronic disease you are already diagnosed with). Preventive services for you include:  - Medicare offers their members a free annual wellness visit, which is time for you and your primary care provider to discuss and plan for your preventive service needs. Take advantage of this benefit every year!  -All adults over the age of 72 should receive the recommended pneumonia vaccines. Current USPSTF guidelines recommend a series of two vaccines for the best pneumonia protection.   -All adults should have a flu vaccine yearly and a tetanus vaccine every 10 years.   -All adults age 48 and older should receive the shingles vaccines (series of two vaccines).       -All adults age 38-68 who are overweight should have a diabetes screening test once every three years.   -All adults born between [birth year] and 1965 should be screened once for Hepatitis C.  -Other screening tests and preventive services for persons with diabetes include: an eye exam to screen for diabetic retinopathy, a kidney function test, a foot exam, and stricter control over your cholesterol.   -Cardiovascular screening for adults with routine risk involves an electrocardiogram (ECG) at intervals determined by your doctor.   -Colorectal cancer screenings should be done for adults age 54-65 with no increased risk factors for colorectal cancer. There are a number of acceptable methods of screening for this type of cancer. Each test has its own benefits and drawbacks. Discuss with your doctor what is most appropriate for you during your annual wellness visit. The different tests include: colonoscopy (considered the best screening method), a fecal occult blood test, a fecal DNA test, and sigmoidoscopy.    -A bone mass density test is recommended when a woman turns 65 to screen for osteoporosis. This test is only recommended one time, as a screening. Some providers will use this same test as a disease monitoring tool if you already have osteoporosis. -Breast cancer screenings are recommended every other year for women of normal risk, age 54-69.  -Cervical cancer screenings for women over age 72 are only recommended with certain risk factors. Here is a list of your current Health Maintenance items (your personalized list of preventive services) with a due date: There are no preventive care reminders to display for this patient. The next time you have a hard stool, and difficulty passing a bowel movement, then try 1/2 capful of Miralax powder mixed in water, to help produce a bowel movement. Wear compression stockings to treat the swelling.  You may only be able to wear them for 1-2 hours during the day at first. Try to gradually increase how long you wear them each day, so the goal is to wear them during the day when awake to prevent swelling. Low Sodium Diet (2,000 Milligram): Care Instructions  Overview     Limiting sodium can be an important part of managing some health problems. The most common source of sodium is salt. People get most of the salt in their diet from canned, prepared, and packaged foods. Fast food and restaurant meals also are very high in sodium. Your doctor will probably limit your sodium to less than 2,000 milligrams (mg) a day. This limit counts all the sodium in prepared and packaged foods and any salt you add to your food. Follow-up care is a key part of your treatment and safety. Be sure to make and go to all appointments, and call your doctor if you are having problems. It's also a good idea to know your test results and keep a list of the medicines you take. How can you care for yourself at home? Read food labels  · Read labels on cans and food packages. The labels tell you how much sodium is in each serving. Make sure that you look at the serving size. If you eat more than the serving size, you have eaten more sodium. · Food labels also tell you the Percent Daily Value for sodium. Choose products with low Percent Daily Values for sodium. · Be aware that sodium can come in forms other than salt, including monosodium glutamate (MSG), sodium citrate, and sodium bicarbonate (baking soda). MSG is often added to Asian food. When you eat out, you can sometimes ask for food without MSG or added salt. Buy low-sodium foods  · Buy foods that are labeled \"unsalted\" (no salt added), \"sodium-free\" (less than 5 mg of sodium per serving), or \"low-sodium\" (140 mg or less of sodium per serving). Foods labeled \"reduced-sodium\" and \"light sodium\" may still have too much sodium. Be sure to read the label to see how much sodium you are getting. · Buy fresh vegetables, or frozen vegetables without added sauces. Buy low-sodium versions of canned vegetables, soups, and other canned goods.   Prepare low-sodium meals  · Cut back on the amount of salt you use in cooking. This will help you adjust to the taste. Do not add salt after cooking. One teaspoon of salt has about 2,300 mg of sodium. · Take the salt shaker off the table. · Flavor your food with garlic, lemon juice, onion, vinegar, herbs, and spices. Do not use soy sauce, lite soy sauce, steak sauce, onion salt, garlic salt, celery salt, or ketchup on your food. · Use low-sodium salad dressings, sauces, and ketchup. Or make your own salad dressings and sauces without adding salt. · Use less salt (or none) when recipes call for it. You can often use half the salt a recipe calls for without losing flavor. Other foods such as rice, pasta, and grains do not need added salt. · Rinse canned vegetables, and cook them in fresh water. This removes somebut not allof the salt. · Avoid water that is naturally high in sodium or that has been treated with water softeners, which add sodium. If you buy bottled water, read the label and choose a sodium-free brand. Avoid high-sodium foods  · Avoid eating:  ? Smoked, cured, salted, and canned meat, fish, and poultry. ? Ham, dover, hot dogs, and luncheon meats. ? Regular, hard, and processed cheese and regular peanut butter. ? Crackers with salted tops, and other salted snack foods such as pretzels, chips, and salted popcorn. ? Frozen prepared meals, unless labeled low-sodium. ? Canned and dried soups, broths, and bouillon, unless labeled sodium-free or low-sodium. ? Canned vegetables, unless labeled sodium-free or low-sodium. ? Hilda Crutch fries, pizza, tacos, and other fast foods. ? Pickles, olives, ketchup, and other condiments, especially soy sauce, unless labeled sodium-free or low-sodium. Where can you learn more? Go to http://www.gray.com/  Enter V250 in the search box to learn more about \"Low Sodium Diet (2,000 Milligram): Care Instructions. \"  Current as of: December 17, 2020               Content Version: 12.8  © 6516-6613 [employer]. Care instructions adapted under license by [employer] (which disclaims liability or warranty for this information). If you have questions about a medical condition or this instruction, always ask your healthcare professional. [address] any warranty or liability for your use of this information.

## 2021-08-03 ENCOUNTER — DOCUMENTATION ONLY (OUTPATIENT)
Dept: CASE MANAGEMENT | Age: 86
End: 2021-08-03

## 2021-08-03 ENCOUNTER — PATIENT OUTREACH (OUTPATIENT)
Dept: CASE MANAGEMENT | Age: 86
End: 2021-08-03

## 2021-08-04 NOTE — ACP (ADVANCE CARE PLANNING)
Advance Care Planning   Ambulatory ACP Specialist Patient Outreach    Date:  8/3/2021    ACP Specialist:  Teresa Horta LMSW    Outreach call to patient in follow-up to ACP Specialist referral from:    [] PCP  [x] Provider   [] Ambulatory Care Management [] Other     For:                  [x] Advance Directive Assistance              [] Complete Portable DNR order              [] Complete POST/MOST              [] Code Status Discussion             [] Discuss Goals of Care             [x] Early ACP Decision-Making              [] Other (Specify)    Date Referral Received:8/3/21    Today's Outreach:  [x] First   [] Second  [] Third       Third outreach made by: [x] Phone  [] Email / mail    [] [employer]     Intervention:  [x] Spoke with Patient   [] Left VM requesting return call      Outcome: SW called and spoke with pt who said that she got info to review from her provider and she hasn't had enough time to look over that information yet. SW will fu with pt next week to see if she's ready to move forward with completing an AMD     Next Step:   [] ACP scheduled conversation  [x] Outreach again in one week               [] Email / Mail [address]  [] Email / Mail Advance Directive   [] Closing referral.  Routing closure to referring provider/staff and to ACP Specialist . [] Closure letter mailed to patient with invitation to contact ACP Specialist if / when ready.   Thank you for this referral.

## 2021-08-10 ENCOUNTER — DOCUMENTATION ONLY (OUTPATIENT)
Dept: CASE MANAGEMENT | Age: 86
End: 2021-08-10

## 2021-08-10 NOTE — ACP (ADVANCE CARE PLANNING)
Advance Care Planning   Ambulatory ACP Specialist Patient Outreach    Date:  8/10/2021    ACP Specialist:  Lauren Saleh LMSW    Outreach call to patient in follow-up to ACP Specialist referral from:    [] PCP  [x] Provider   [] Ambulatory Care Management [] Other     For:                  [x] Advance Directive Assistance              [] Complete Portable DNR order              [] Complete POST/MOST              [] Code Status Discussion             [] Discuss Goals of Care             [x] Early ACP Decision-Making              [] Other (Specify)    Date Referral Received: Today's Outreach:  [] First   [x] Second  [] Third       Third outreach made by: [] Phone  [] Email / mail    [] MyChart     Intervention:  [x] Spoke with Patient   [] Left VM requesting return call      Outcome: SW called and spoke with pt who said that she had reviewed the info SW emailed her and she's not ready to move forward at this time. SW made sure pt knows how to get in touch with ACP team if she wants to complete an AMD in the future      Next Step:   [] ACP scheduled conversation  [] Outreach again in one week               [] Email / Mail [address]  [] Email / Mail Advance Directive   [x] Closing referral.  Routing closure to referring provider/staff and to ACP Specialist . [] Closure letter mailed to patient with invitation to contact ACP Specialist if / when ready.   Thank you for this referral.

## 2021-11-04 ENCOUNTER — OFFICE VISIT (OUTPATIENT)
Dept: INTERNAL MEDICINE CLINIC | Age: 86
End: 2021-11-04
Payer: MEDICARE

## 2021-11-04 VITALS
SYSTOLIC BLOOD PRESSURE: 132 MMHG | DIASTOLIC BLOOD PRESSURE: 76 MMHG | BODY MASS INDEX: 28.91 KG/M2 | TEMPERATURE: 97.9 F | HEIGHT: 65 IN | HEART RATE: 65 BPM | RESPIRATION RATE: 16 BRPM | WEIGHT: 173.5 LBS | OXYGEN SATURATION: 99 %

## 2021-11-04 DIAGNOSIS — R71.8 OTHER ABNORMALITY OF RED BLOOD CELLS: ICD-10-CM

## 2021-11-04 DIAGNOSIS — I49.5 SSS (SICK SINUS SYNDROME) (HCC): ICD-10-CM

## 2021-11-04 DIAGNOSIS — R53.83 FATIGUE, UNSPECIFIED TYPE: ICD-10-CM

## 2021-11-04 DIAGNOSIS — S70.01XA CONTUSION OF RIGHT HIP, INITIAL ENCOUNTER: ICD-10-CM

## 2021-11-04 DIAGNOSIS — E55.9 VITAMIN D DEFICIENCY: ICD-10-CM

## 2021-11-04 DIAGNOSIS — I10 ESSENTIAL HYPERTENSION: Primary | ICD-10-CM

## 2021-11-04 DIAGNOSIS — E53.8 VITAMIN B 12 DEFICIENCY: ICD-10-CM

## 2021-11-04 DIAGNOSIS — Z86.73 HISTORY OF CVA (CEREBROVASCULAR ACCIDENT): ICD-10-CM

## 2021-11-04 DIAGNOSIS — R73.03 PREDIABETES: ICD-10-CM

## 2021-11-04 DIAGNOSIS — R60.0 LOCALIZED EDEMA: ICD-10-CM

## 2021-11-04 DIAGNOSIS — R40.0 DAYTIME SOMNOLENCE: ICD-10-CM

## 2021-11-04 DIAGNOSIS — E78.2 MIXED HYPERLIPIDEMIA: ICD-10-CM

## 2021-11-04 PROCEDURE — G8432 DEP SCR NOT DOC, RNG: HCPCS | Performed by: PHYSICIAN ASSISTANT

## 2021-11-04 PROCEDURE — 1101F PT FALLS ASSESS-DOCD LE1/YR: CPT | Performed by: PHYSICIAN ASSISTANT

## 2021-11-04 PROCEDURE — 99214 OFFICE O/P EST MOD 30 MIN: CPT | Performed by: PHYSICIAN ASSISTANT

## 2021-11-04 PROCEDURE — G8419 CALC BMI OUT NRM PARAM NOF/U: HCPCS | Performed by: PHYSICIAN ASSISTANT

## 2021-11-04 PROCEDURE — G8536 NO DOC ELDER MAL SCRN: HCPCS | Performed by: PHYSICIAN ASSISTANT

## 2021-11-04 PROCEDURE — 1090F PRES/ABSN URINE INCON ASSESS: CPT | Performed by: PHYSICIAN ASSISTANT

## 2021-11-04 PROCEDURE — G8427 DOCREV CUR MEDS BY ELIG CLIN: HCPCS | Performed by: PHYSICIAN ASSISTANT

## 2021-11-04 NOTE — PROGRESS NOTES
Nel Brady is a 80 y.o. female     Chief Complaint   Patient presents with    Cholesterol Problem     3M follow up       Visit Vitals  /76 (BP 1 Location: Left arm, BP Patient Position: Sitting, BP Cuff Size: Adult)   Pulse 65   Temp 97.9 °F (36.6 °C) (Temporal)   Resp 16   Ht 5' 4.5\" (1.638 m)   Wt 173 lb 8 oz (78.7 kg)   LMP  (LMP Unknown)   SpO2 99%   BMI 29.32 kg/m²       Health Maintenance Due   Topic Date Due    Flu Vaccine (1) Never done       1. Have you been to the ER, urgent care clinic since your last visit? Hospitalized since your last visit? No     2. Have you seen or consulted any other health care providers outside of the [address] [place] [address] since your last visit? Include any pap smears or colon screening. No     Patient scheduled for covid booster today. Will get flu shot at a later date.

## 2021-11-04 NOTE — PATIENT INSTRUCTIONS
Statins: Care Instructions Overview Statins are medicines that lower your cholesterol and your risk for a heart attack and stroke. Cholesterol is a type of fat in your blood. If you have too much cholesterol, it can build up in blood vessels. This raises your risk of coronary artery disease, heart attack, and stroke. Statins lower cholesterol by blocking how much your body makes. This prevents cholesterol from building up in your blood vessels. This is called hardening of the arteries. It is the starting point for some heart and blood flow problems, such as coronary artery disease. Statins may also reduce inflammation around the buildup (called plaque). This can lower the risk that the plaque will break apart and lead to a heart attack or stroke. A heart-healthy lifestyle is important for lowering your risk whether you take statins or not. This includes eating healthy foods, being active, staying at a healthy weight, and not smoking. Examples of statins include: · Atorvastatin (Lipitor). · Pravastatin (Pravachol). · Simvastatin (Zocor). Statins interact with many medicines. So tell your doctor all of the other medicines that you take. These include prescription medicines, over-the-counter medicines, dietary supplements, and herbal products. Take a statin regularly so that it can work well. High cholesterol doesn't make you feel sick. That's why some people may not feel that they need to take their medicine. But it's important to take your statin because it can lower your risk of heart attack and stroke. Talk with your doctor if you have side effects that bother you. Follow-up care is a key part of your treatment and safety. Be sure to make and go to all appointments, and call your doctor if you are having problems. It's also a good idea to know your test results and keep a list of the medicines you take. How can you care for yourself at home? · Take statins exactly as your doctor tells you. High cholesterol has no symptoms. So it is easy to forget to take the pills. Try to make a system that reminds you to take them. · Check with your doctor or pharmacist before you use any other medicines, including over-the-counter medicines. Make sure your doctor knows all of the medicines, vitamins, herbal products, and supplements you take. Taking some medicines together can cause problems. · Call your doctor if you have side effects that bother you. There may be different statins you can try. Work with your doctor to find the right statin and amount for you. · Have a heart-healthy lifestyle. Eat heart-healthy foods, be active, don't smoke, and stay at a healthy weight. · Talk to your doctor about avoiding grapefruit juice if you take statins. Grapefruit juice can raise the level of this medicine in your blood. This could increase side effects. When should you call for help? Watch closely for changes in your health, and be sure to contact your doctor if: 
  · You think you are having problems with your medicine.  
  · You have aches or muscle pain. Where can you learn more? Go to http://www.gray.com/ Enter R358 in the search box to learn more about \"Statins: Care Instructions. \" Current as of: April 29, 2021               Content Version: 13.0 © 8890-8177 [employer]. Care instructions adapted under license by [employer] (which disclaims liability or warranty for this information). If you have questions about a medical condition or this instruction, always ask your healthcare professional. [place] [address] any warranty or liability for your use of this information. Wear compression stockings to treat the swelling. You may only be able to wear them for 1-2 hours during the day at first. Try to gradually increase how long you wear them each day, so the goal is to wear them during the day when awake to prevent swelling.  
 
Medium pressure Other size based on your shoe size, look at the size chart on the package Low Sodium Diet (2,000 Milligram): Care Instructions Overview Limiting sodium can be an important part of managing some health problems. The most common source of sodium is salt. People get most of the salt in their diet from canned, prepared, and packaged foods. Fast food and restaurant meals also are very high in sodium. Your doctor will probably limit your sodium to less than 2,000 milligrams (mg) a day. This limit counts all the sodium in prepared and packaged foods and any salt you add to your food. Follow-up care is a key part of your treatment and safety. Be sure to make and go to all appointments, and call your doctor if you are having problems. It's also a good idea to know your test results and keep a list of the medicines you take. How can you care for yourself at home? Read food labels · Read labels on cans and food packages. The labels tell you how much sodium is in each serving. Make sure that you look at the serving size. If you eat more than the serving size, you have eaten more sodium. · Food labels also tell you the Percent Daily Value for sodium. Choose products with low Percent Daily Values for sodium. · Be aware that sodium can come in forms other than salt, including monosodium glutamate (MSG), sodium citrate, and sodium bicarbonate (baking soda). MSG is often added to Asian food. When you eat out, you can sometimes ask for food without MSG or added salt. Buy low-sodium foods · Buy foods that are labeled \"unsalted\" (no salt added), \"sodium-free\" (less than 5 mg of sodium per serving), or \"low-sodium\" (140 mg or less of sodium per serving). Foods labeled \"reduced-sodium\" and \"light sodium\" may still have too much sodium. Be sure to read the label to see how much sodium you are getting. · Buy fresh vegetables, or frozen vegetables without added sauces.  Buy low-sodium versions of canned vegetables, soups, and other canned goods. Prepare low-sodium meals · Cut back on the amount of salt you use in cooking. This will help you adjust to the taste. Do not add salt after cooking. One teaspoon of salt has about 2,300 mg of sodium. · Take the salt shaker off the table. · Flavor your food with garlic, lemon juice, onion, vinegar, herbs, and spices. Do not use soy sauce, lite soy sauce, steak sauce, onion salt, garlic salt, celery salt, or ketchup on your food. · Use low-sodium salad dressings, sauces, and ketchup. Or make your own salad dressings and sauces without adding salt. · Use less salt (or none) when recipes call for it. You can often use half the salt a recipe calls for without losing flavor. Other foods such as rice, pasta, and grains do not need added salt. · Rinse canned vegetables, and cook them in fresh water. This removes somebut not allof the salt. · Avoid water that is naturally high in sodium or that has been treated with water softeners, which add sodium. If you buy bottled water, read the label and choose a sodium-free brand. Avoid high-sodium foods · Avoid eating: 
? Smoked, cured, salted, and canned meat, fish, and poultry. ? Ham, dover, hot dogs, and luncheon meats. ? Regular, hard, and processed cheese and regular peanut butter. ? Crackers with salted tops, and other salted snack foods such as pretzels, chips, and salted popcorn. ? Frozen prepared meals, unless labeled low-sodium. ? Canned and dried soups, broths, and bouillon, unless labeled sodium-free or low-sodium. ? Canned vegetables, unless labeled sodium-free or low-sodium. ? [place] [employer] fries, pizza, tacos, and other fast foods. ? Pickles, olives, ketchup, and other condiments, especially soy sauce, unless labeled sodium-free or low-sodium. Where can you learn more? Go to http://www.gray.com/ Enter J493 in the search box to learn more about \"Low Sodium Diet (2,000 Milligram): Care Instructions. \" Current as of: December 17, 2020               Content Version: 13.0 © 1970-7109 [employer]. Care instructions adapted under license by [employer] (which disclaims liability or warranty for this information). If you have questions about a medical condition or this instruction, always ask your healthcare professional. [facility] [address] any warranty or liability for your use of this information. Prediabetes: Care Instructions Overview Prediabetes is a warning sign that you're at risk for getting type 2 diabetes. It means that your blood sugar is higher than it should be. But it's not high enough to be diabetes. The food you eat naturally turns into sugar. Your body uses the sugar for energy. Normally, an organ called the pancreas makes insulin. And insulin allows the sugar in your blood to get into your body's cells. But sometimes the body can't use insulin the right way. So the sugar stays in your blood instead. This is called insulin resistance. The buildup of sugar in your blood means you have prediabetes. The good news is that you may be able to prevent or delay diabetes. Making small lifestyle changes, like getting active and changing your eating habits, may help you get your blood sugar back to normal. You can work with your doctor to make a treatment plan. Follow-up care is a key part of your treatment and safety. Be sure to make and go to all appointments, and call your doctor if you are having problems. It's also a good idea to know your test results and keep a list of the medicines you take. How can you care for yourself at home? · Watch your weight. A healthy weight helps your body use insulin properly. · Limit the amount of calories, sweets, and unhealthy fat you eat. Ask your doctor if you should see a dietitian. A registered dietitian can help you create meal plans that fit your lifestyle.  
· Get at least 30 minutes of exercise on most days of the week. Exercise helps control your blood sugar. It also helps you maintain a healthy weight. Walking is a good choice. You also may want to do other activities, such as running, swimming, cycling, or playing tennis or team sports. · Do not smoke. Smoking can make prediabetes worse. If you need help quitting, talk to your doctor about stop-smoking programs and medicines. These can increase your chances of quitting for good. · If your doctor prescribed medicines, take them exactly as prescribed. Call your doctor if you think you are having a problem with your medicine. You will get more details on the specific medicines your doctor prescribes. When should you call for help? Watch closely for changes in your health, and be sure to contact your doctor if: 
  · You have any symptoms of diabetes. These may include: 
? Being thirsty more often. ? Urinating more. ? Being hungrier. ? Losing weight. ? Being very tired. ? Having blurry vision.  
  · You have a wound that will not heal.  
  · You have an infection that will not go away.  
  · You have problems with your blood pressure.  
  · You want more information about diabetes and how you can keep from getting it. Where can you learn more? Go to http://www.gray.com/ Enter I222 in the search box to learn more about \"Prediabetes: Care Instructions. \" Current as of: August 31, 2020               Content Version: 13.0 © 7983-5114 [employer]. Care instructions adapted under license by [employer] (which disclaims liability or warranty for this information). If you have questions about a medical condition or this instruction, always ask your healthcare professional. [place] [address] any warranty or liability for your use of this information. Learning About the [address] Diet What is the Mediterranean diet?  
  
The Mediterranean diet is a style of eating rather than a diet plan. It features foods eaten in [place], [place], [place], and other countries along the [facility]. It emphasizes eating foods like fish, fruits, vegetables, beans, high-fiber breads and whole grains, nuts, and olive oil. This style of eating includes limited red meat, cheese, and sweets. Why choose the Mediterranean diet? A Mediterranean-style diet may improve heart health. It contains more fat than other heart-healthy diets. But the fats are mainly from nuts, unsaturated oils (such as fish oils and olive oil), and certain nut or seed oils (such as canola, soybean, or flaxseed oil). These fats may help protect the heart and blood vessels. How can you get started on the Mediterranean diet? Here are some things you can do to switch to a more Mediterranean way of eating. What to eat · Eat a variety of fruits and vegetables each day, such as grapes, blueberries, tomatoes, broccoli, peppers, figs, olives, spinach, eggplant, beans, lentils, and chickpeas. · Eat a variety of whole-grain foods each day, such as oats, brown rice, and whole wheat bread, pasta, and couscous. · Eat fish at least 2 times a week. Try tuna, salmon, mackerel, lake trout, herring, or sardines. · Eat moderate amounts of low-fat dairy products, such as milk, cheese, or yogurt. · Eat moderate amounts of poultry and eggs. · Choose healthy (unsaturated) fats, such as nuts, olive oil, and certain nut or seed oils like canola, soybean, and flaxseed. · Limit unhealthy (saturated) fats, such as butter, palm oil, and coconut oil. And limit fats found in animal products, such as meat and dairy products made with whole milk. Try to eat red meat only a few times a month in very small amounts. · Limit sweets and desserts to only a few times a week. This includes sugar-sweetened drinks like soda.  
The Mediterranean diet may also include red wine with your meal1 glass each day for women and up to 2 glasses a day for men. Tips for eating at home · Use herbs, spices, garlic, lemon zest, and citrus juice instead of salt to add flavor to foods. · Add avocado slices to your sandwich instead of dover. · Have fish for lunch or dinner instead of red meat. Brush the fish with olive oil, and broil or grill it. · Sprinkle your salad with seeds or nuts instead of cheese. · Cook with olive or canola oil instead of butter or oils that are high in saturated fat. · Switch from 2% milk or whole milk to 1% or fat-free milk. · Dip raw vegetables in a vinaigrette dressing or hummus instead of dips made from mayonnaise or sour cream. 
· Have a piece of fruit for dessert instead of a piece of cake. Try baked apples, or have some dried fruit. Tips for eating out · Try broiled, grilled, baked, or poached fish instead of having it fried or breaded. · Ask your  to have your meals prepared with olive oil instead of butter. · Order dishes made with marinara sauce or sauces made from olive oil. Avoid sauces made from cream or mayonnaise. · Choose whole-grain breads, whole wheat pasta and pizza crust, brown rice, beans, and lentils. · Cut back on butter or margarine on bread. Instead, you can dip your bread in a small amount of olive oil. · Ask for a side salad or grilled vegetables instead of french fries or chips. Where can you learn more? Go to http://www.gray.com/ Enter 247-954-3677 in the search box to learn more about \"Learning About the Mediterranean Diet. \" Current as of: December 17, 2020               Content Version: 13.0 © 7154-0954 [employer]wise[employer]. Care instructions adapted under license by [employer] (which disclaims liability or warranty for this information). If you have questions about a medical condition or this instruction, always ask your healthcare professional. [address]  any warranty or liability for your use of this information.

## 2021-11-04 NOTE — PROGRESS NOTES
HPI:  80 y.o.  presents for follow up appointment. No hospital, ER or specialist visits since last primary care visit except as noted below.     Accompanied by daughter, Kera Summers, who is visiting from [place]    Prediabetes   -A1C 5.8% in labs at [facility] in [facility]    Dyslipidemia   9/2020, 197 12/2020  HDL 69 12/2020  She has declined statin    Prior CVA  on head CT 12/28/2015 noted old left sided infarct, 2/2016 normal carotid duplex done by Dr Josseline Betancourt  -per Dr Lloyd Ee note 1/20/2017, she has refused to take a statin  -she was previously on  mg, but this was stopped 2/2019   -TIA 12/2020, evaluated at [facility] ER    HTN and SSS  Followed by cards Dr Josseline Betancourt, next appt 12/8/21  Does not have pacemaker  On losartan    Edema  Chronic for her and unchanged  I reviewed compression stockings at last visit but she states she could not find the  socks I had recommended, so we reviewed again  -we also had reviewed low salt diet, she has not changed her diet, but lives alone and eats a lot of frozen foods that are fresh-frozen veges and raw meat or fish filets that she cooks      She lives by herself    She reports she fell on 10/20/21  She slid on a rug fringe in her house  She fell on her right side, thigh, hip  She pressed her medical alert button and EMS arrived, the fire department came and checked on her  She was able to get up on her own  She declined ER visit  She reports some soreness, but improving, and is able to walk on her leg per her usual  No groin pain  She did not hit head, no LOC     In general over the last year she has noticed it is more difficult to bend down and get in to a car and lift her legs in to the car  Denies back pain and reports has not had chronic back pain  No tingling   In general she reports she feels overall weak    She is less active  Reports she now falls asleep watching TV during the day which is unusual for her  She previously walked often, but since the pandemic she has overall been less busy/less active    She denies CP, MAURICE, dizziness      Patient Active Problem List    Diagnosis    PCO (posterior capsular opacification), bilateral    Macular degeneration (senile) of retina    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 Coronel Comings impaction 12/20/15    [place] [facility] notes    Chronic pain     left hip pain in the am    Cystocele 10/20/14    with nocturia [facility] rec'd     DDD (degenerative disc disease)     Elevated lipids     Essential hypertension     Hematuria 10/2008    renal cyst found on ultrasound but acute cystitis [facility]    Jaw fracture ([address] 11/2017    Laceration of foot 12/23/2016    flap type-sutures not done plain Td given    Left sided lacunar infarction ([address] 12/28/15    found on CT scan of head-old issue    Macular degeneration     VEI dr Jairo Lopez    OM (otitis media), acute 6/4/15    [facility] and 10/14/15 ENT Wandra Main    Prolapse of female bladder, acquired 2012    Was treated with PT    Screening for glaucoma 02/27/2017 & 5/23/17    [place] [facility]    Swelling of limb, right     Thyroid nodule [id number]    colloid nodule dr Kingsley Babinski    Tinnitus 1/6/16     [facility]  -neck mass being monitored    Urinary urgency 2015          3/9/15    Physical Therapy thru [facility] notes rec'd    Vitamin D deficiency 10/2013       Social History     Tobacco Use    Smoking status: Never Smoker    Smokeless tobacco: Never Used   Vaping Use    Vaping Use: Never used   Substance Use Topics    Alcohol use: Yes     Comment: social    Drug use: No       Outpatient Medications Marked as Taking for the 11/4/21 encounter (Office Visit) with Paris Langford PA-C   Medication Sig Dispense Refill    losartan (COZAAR) 50 mg tablet Take 1 Tab by mouth daily. 90 Tab 3    estradioL (ESTRACE) 0.01 % (0.1 mg/gram) vaginal cream APPLY FINGERTIP SIZE AMOUNT TO VAGINA 3 TIMES/WEEK AT BEDTIME      trospium (SANCTURA XL) 60 mg capsule TAKE 1 CAPSULE BY MOUTH EVERY DAY  10    CHOLECALCIFEROL, VITAMIN D3, (VITAMIN D3 PO) Take 1 Tab by mouth daily.  peg 400-propylene glycol (SYSTANE) 0.4-0.3 % drop Administer 1 Drop to both eyes nightly.  fexofenadine (ALLEGRA) 180 mg tablet Take 180 mg by mouth daily. Allergies   Allergen Reactions    Lisinopril-Hydrochlorothiazide Rash    Caffeine Other (comments)     insomnia       ROS:  ROS negative except as per HPI. PE:  Visit Vitals  /76 (BP 1 Location: Left arm, BP Patient Position: Sitting, BP Cuff Size: Adult)   Pulse 65   Temp 97.9 °F (36.6 °C) (Temporal)   Resp 16   Ht 5' 4.5\" (1.638 m)   Wt 173 lb 8 oz (78.7 kg)   LMP  (LMP Unknown)   SpO2 99%   BMI 29.32 kg/m²     Gen: alert, oriented, no acute distress  Head: normocephalic, atraumatic  Eyes: pupils equal round reactive to light, sclera clear, conjunctiva clear  Oral: masked  Neck: supple  Resp: no increase work of breathing, lungs clear to ausculation bilaterally, no wheezing, rales or rhonchi  CV: S1, S2 normal.  No murmurs, rubs, or gallops. Abd: soft, not tender, not distended. Normal bowel sounds. Neuro: normal strength 5/5 and movement in bilateral lower extremities with exception of symmetric slight decrease in external and internal hip rotation, sensation intact and symmetric.   Skin: no lesion or rash, no ecchymosis on right leg  Extremities: bilateral nonpitting edema of feet, ankles, and lower tibia  (+)tender over right greater trochanter  No low back or groin tenderness      No results found for this visit on 11/04/21. Lab Results   Component Value Date/Time    WBC 4.4 09/11/2020 11:16 AM    HGB 12.1 09/11/2020 11:16 AM    HCT 39.2 09/11/2020 11:16 AM    PLATELET 684 68/54/4726 11:16 AM    MCV 84.5 09/11/2020 11:16 AM     Lab Results   Component Value Date/Time    Sodium 143 09/11/2020 11:16 AM    Potassium 3.6 09/11/2020 11:16 AM    Chloride 104 09/11/2020 11:16 AM    CO2 31 09/11/2020 11:16 AM    Anion gap 8 09/11/2020 11:16 AM    Glucose 81 09/11/2020 11:16 AM    BUN 13 09/11/2020 11:16 AM    Creatinine 0.62 09/11/2020 11:16 AM    BUN/Creatinine ratio 21 (H) 09/11/2020 11:16 AM    GFR est AA >60 09/11/2020 11:16 AM    GFR est non-AA >60 09/11/2020 11:16 AM    Calcium 9.1 09/11/2020 11:16 AM    Bilirubin, total 0.3 09/11/2020 11:16 AM    Alk. phosphatase 97 09/11/2020 11:16 AM    Protein, total 6.9 09/11/2020 11:16 AM    Albumin 3.3 (L) 09/11/2020 11:16 AM    Globulin 3.6 09/11/2020 11:16 AM    A-G Ratio 0.9 (L) 09/11/2020 11:16 AM    ALT (SGPT) 12 09/11/2020 11:16 AM    AST (SGOT) 15 09/11/2020 11:16 AM     Lab Results   Component Value Date/Time    TSH 3.59 09/11/2020 11:16 AM     No results found for: HBA1C, YMQ9NOEO, IJI3MKUE  Lab Results   Component Value Date/Time    Cholesterol, total 258 (H) 09/11/2020 11:16 AM    HDL Cholesterol 61 09/11/2020 11:16 AM    LDL, calculated 182.2 (H) 09/11/2020 11:16 AM    VLDL, calculated 14.8 09/11/2020 11:16 AM    Triglyceride 74 09/11/2020 11:16 AM    CHOL/HDL Ratio 4.2 09/11/2020 11:16 AM     Lab Results   Component Value Date/Time    Vitamin D 25-Hydroxy 18.5 (L) 05/25/2011 11:09 AM    VITAMIN D, 25-HYDROXY 40.4 06/11/2019 10:38 AM       No results found for: B12LT, FOL, RBCF      Assessment/Plan:    1. Essential hypertension  HTN - on losartan 50 mg; well controlled. Continue current medication. Exercise, and low salt/ low caffeine diet were discussed.     2. Localized edema  Chronic, stable  Reviewed compression stockings  Low salt diet, tips given    3. Prediabetes  -A1C 5.8% in labs at [facility] in [facility] 12/2020  -recommend referral to nutritionist but she declines  -reviewed low carb diet, handout given, recheck labs today    4. History of CVA (cerebrovascular accident)  on head CT 12/28/2015 noted old left sided infarct, 2/2016 normal carotid duplex done by Dr Loki Wolff  -she has declined statin  -we reviewed again today    5. SSS (sick sinus syndrome) ([facility] [address] [address]  Followed by cards Dr Loki Wolff, next appt 12/8/21  Does not have pacemaker    6. Vitamin D deficiency  Level 19.3 in 2013  Most recent check normal at 45    7. Mixed hyperlipidemia   9/2020, 197 12/2020  HDL 69 12/2020  She has declined statin  We reviewed again today, handout given on statins    8. Fatigue, unspecified type  Associated with daytime somnolence, chronic edema, h/o CVA  Has SSS followed by cards, does not have pacemaker  Will check holter monitor and echo  Will check labs, TSH, A1C, CBC  Recommend activities to keep mind active such as puzzles, crossword, socializing, walking to stay active, bike pedal, chair exercises    9. Daytime somnolence  See #8    10. Vitamin B 12 deficiency    11. Contusion of right hip, initial encounter  Fall on side 10/20/20  No ecchymosis, no groin pain  I suspect underlying OA related to difficulty lifting leg in to car  I recommend xray to R/O fx and then PT for strengthening and improving flexibility/ROM, but she declines both  Reviewed chair exercises, stretching    12. Other abnormality of red blood cells  RDW has been elevated, will check ferritin and B12    Health Maintenance reviewed - updated. ICD-10-CM ICD-9-CM    1. Essential hypertension  I10 401.9 CBC WITH AUTOMATED DIFF      METABOLIC PANEL, COMPREHENSIVE      TSH 3RD GENERATION   2. Localized edema  R60.0 782.3 ECHOCARDIOGRAM      CARDIAC HOLTER MONITOR      TSH 3RD GENERATION   3.  Prediabetes  R73.03 790.29 METABOLIC PANEL, COMPREHENSIVE      HEMOGLOBIN A1C WITH EAG      CANCELED: HEMOGLOBIN A1C WITH EAG      CANCELED: HEMOGLOBIN A1C WITH EAG   4. History of CVA (cerebrovascular accident)  Z80.78 V12.54 ECHOCARDIOGRAM      CARDIAC HOLTER MONITOR      CANCELED: LIPID PANEL      CANCELED: LIPID PANEL   5. SSS (sick sinus syndrome) ([facility])  I49.5 427.81 ECHOCARDIOGRAM      CARDIAC HOLTER MONITOR   6. Vitamin D deficiency  E55.9 268.9 VITAMIN D, 25 HYDROXY      CANCELED: VITAMIN D, 25 HYDROXY      CANCELED: VITAMIN D, 25 HYDROXY   7. Mixed hyperlipidemia  E78.2 272.2 LIPID PANEL      CANCELED: METABOLIC PANEL, COMPREHENSIVE      CANCELED: LIPID PANEL      CANCELED: LIPID PANEL      CANCELED: METABOLIC PANEL, COMPREHENSIVE   8. Fatigue, unspecified type  R53.83 780.79 CBC WITH AUTOMATED DIFF      TSH 3RD GENERATION      VITAMIN B12 & FOLATE      FERRITIN      CANCELED: CBC WITH AUTOMATED DIFF      CANCELED: VITAMIN B12 & FOLATE      CANCELED: TSH 3RD GENERATION      CANCELED: TSH 3RD GENERATION      CANCELED: VITAMIN B12 & FOLATE      CANCELED: CBC WITH AUTOMATED DIFF   9. Daytime somnolence  R40.0 780.54 CBC WITH AUTOMATED DIFF      VITAMIN B12 & FOLATE      FERRITIN   10. Vitamin B 12 deficiency  E53.8 266.2 VITAMIN B12 & FOLATE      CANCELED: VITAMIN B12 & FOLATE      CANCELED: VITAMIN B12 & FOLATE   11. Contusion of right hip, initial encounter  S70.01XA 924.01    12.  Other abnormality of red blood cells   R71.8 790.09 VITAMIN B12 & FOLATE      FERRITIN         Orders Placed This Encounter    CBC WITH AUTOMATED DIFF     Standing Status:   Future     Standing Expiration Date:   90/9/2236    METABOLIC PANEL, COMPREHENSIVE     Standing Status:   Future     Standing Expiration Date:   11/4/2022    HEMOGLOBIN A1C WITH EAG     Standing Status:   Future     Standing Expiration Date:   11/4/2022    LIPID PANEL     Standing Status:   Future     Standing Expiration Date:   11/4/2022    TSH 3RD GENERATION     Standing Status: Future     Standing Expiration Date:   11/4/2022    VITAMIN D, 25 HYDROXY     Standing Status:   Future     Standing Expiration Date:   11/4/2022    VITAMIN B12 & FOLATE     Standing Status:   Future     Standing Expiration Date:   11/4/2022    FERRITIN     Standing Status:   Future     Standing Expiration Date:   11/4/2022    ECHOCARDIOGRAM     Standing Status:   Future     Standing Expiration Date:   5/4/2022     Order Specific Question:   Reason for Exam:     Answer:   fatigue, edema, sick sinus syndrome, history of CVA       There are no discontinued medications. Current Outpatient Medications   Medication Sig Dispense Refill    losartan (COZAAR) 50 mg tablet Take 1 Tab by mouth daily. 90 Tab 3    estradioL (ESTRACE) 0.01 % (0.1 mg/gram) vaginal cream APPLY FINGERTIP SIZE AMOUNT TO VAGINA 3 TIMES/WEEK AT BEDTIME      trospium (SANCTURA XL) 60 mg capsule TAKE 1 CAPSULE BY MOUTH EVERY DAY  10    CHOLECALCIFEROL, VITAMIN D3, (VITAMIN D3 PO) Take 1 Tab by mouth daily.  peg 400-propylene glycol (SYSTANE) 0.4-0.3 % drop Administer 1 Drop to both eyes nightly.  fexofenadine (ALLEGRA) 180 mg tablet Take 180 mg by mouth daily.  benzonatate (TESSALON PERLES) 100 mg capsule Take 1 Cap by mouth three (3) times daily as needed for Cough. (Patient not taking: Reported on 8/2/2021) 20 Cap 0       Recommended healthy diet low in carbohydrates, fats, sodium and cholesterol. Recommended regular cardiovascular exercise 3-6 times per week for 30-60 minutes daily. Verbal and written instructions (see AVS) provided. Patient expresses understanding of diagnosis and treatment plan. Follow-up and Dispositions    · Return in about 3 months (around 2/4/2022) for edema, fatigue, prediabetes.        Future Appointments   Date Time Provider Loretta Pedro   12/8/2021 11:00 AM MD JAIMEE Justin AMB

## 2021-11-09 ENCOUNTER — APPOINTMENT (OUTPATIENT)
Dept: INTERNAL MEDICINE CLINIC | Age: 86
End: 2021-11-09

## 2021-11-09 DIAGNOSIS — E53.8 VITAMIN B 12 DEFICIENCY: ICD-10-CM

## 2021-11-09 DIAGNOSIS — R71.8 OTHER ABNORMALITY OF RED BLOOD CELLS: ICD-10-CM

## 2021-11-09 DIAGNOSIS — R60.0 LOCALIZED EDEMA: ICD-10-CM

## 2021-11-09 DIAGNOSIS — R40.0 DAYTIME SOMNOLENCE: ICD-10-CM

## 2021-11-09 DIAGNOSIS — R53.83 FATIGUE, UNSPECIFIED TYPE: ICD-10-CM

## 2021-11-09 DIAGNOSIS — R73.03 PREDIABETES: ICD-10-CM

## 2021-11-09 DIAGNOSIS — E55.9 VITAMIN D DEFICIENCY: ICD-10-CM

## 2021-11-09 DIAGNOSIS — E78.2 MIXED HYPERLIPIDEMIA: ICD-10-CM

## 2021-11-09 DIAGNOSIS — I10 ESSENTIAL HYPERTENSION: ICD-10-CM

## 2021-11-09 LAB
25(OH)D3 SERPL-MCNC: 39.2 NG/ML (ref 30–100)
ALBUMIN SERPL-MCNC: 3.1 G/DL (ref 3.5–5)
ALBUMIN/GLOB SERPL: 0.9 {RATIO} (ref 1.1–2.2)
ALP SERPL-CCNC: 74 U/L (ref 45–117)
ALT SERPL-CCNC: 14 U/L (ref 12–78)
ANION GAP SERPL CALC-SCNC: 3 MMOL/L (ref 5–15)
AST SERPL-CCNC: 12 U/L (ref 15–37)
BASOPHILS # BLD: 0 K/UL (ref 0–0.1)
BASOPHILS NFR BLD: 1 % (ref 0–1)
BILIRUB SERPL-MCNC: 0.5 MG/DL (ref 0.2–1)
BUN SERPL-MCNC: 15 MG/DL (ref 6–20)
BUN/CREAT SERPL: 27 (ref 12–20)
CALCIUM SERPL-MCNC: 9.6 MG/DL (ref 8.5–10.1)
CHLORIDE SERPL-SCNC: 106 MMOL/L (ref 97–108)
CHOLEST SERPL-MCNC: 255 MG/DL
CO2 SERPL-SCNC: 31 MMOL/L (ref 21–32)
CREAT SERPL-MCNC: 0.55 MG/DL (ref 0.55–1.02)
DIFFERENTIAL METHOD BLD: ABNORMAL
EOSINOPHIL # BLD: 0.1 K/UL (ref 0–0.4)
EOSINOPHIL NFR BLD: 4 % (ref 0–7)
ERYTHROCYTE [DISTWIDTH] IN BLOOD BY AUTOMATED COUNT: 16 % (ref 11.5–14.5)
EST. AVERAGE GLUCOSE BLD GHB EST-MCNC: 117 MG/DL
FERRITIN SERPL-MCNC: 294 NG/ML (ref 26–388)
FOLATE SERPL-MCNC: 8.4 NG/ML (ref 5–21)
GLOBULIN SER CALC-MCNC: 3.5 G/DL (ref 2–4)
GLUCOSE SERPL-MCNC: 82 MG/DL (ref 65–100)
HBA1C MFR BLD: 5.7 % (ref 4–5.6)
HCT VFR BLD AUTO: 39.7 % (ref 35–47)
HDLC SERPL-MCNC: 57 MG/DL
HDLC SERPL: 4.5 {RATIO} (ref 0–5)
HGB BLD-MCNC: 12 G/DL (ref 11.5–16)
IMM GRANULOCYTES # BLD AUTO: 0 K/UL (ref 0–0.04)
IMM GRANULOCYTES NFR BLD AUTO: 0 % (ref 0–0.5)
LDLC SERPL CALC-MCNC: 182.8 MG/DL (ref 0–100)
LYMPHOCYTES # BLD: 1.7 K/UL (ref 0.8–3.5)
LYMPHOCYTES NFR BLD: 45 % (ref 12–49)
MCH RBC QN AUTO: 25.6 PG (ref 26–34)
MCHC RBC AUTO-ENTMCNC: 30.2 G/DL (ref 30–36.5)
MCV RBC AUTO: 84.8 FL (ref 80–99)
MONOCYTES # BLD: 0.3 K/UL (ref 0–1)
MONOCYTES NFR BLD: 8 % (ref 5–13)
NEUTS SEG # BLD: 1.6 K/UL (ref 1.8–8)
NEUTS SEG NFR BLD: 42 % (ref 32–75)
NRBC # BLD: 0 K/UL (ref 0–0.01)
NRBC BLD-RTO: 0 PER 100 WBC
PLATELET # BLD AUTO: 203 K/UL (ref 150–400)
PMV BLD AUTO: 10.1 FL (ref 8.9–12.9)
POTASSIUM SERPL-SCNC: 3.8 MMOL/L (ref 3.5–5.1)
PROT SERPL-MCNC: 6.6 G/DL (ref 6.4–8.2)
RBC # BLD AUTO: 4.68 M/UL (ref 3.8–5.2)
SODIUM SERPL-SCNC: 140 MMOL/L (ref 136–145)
TRIGL SERPL-MCNC: 76 MG/DL (ref ?–150)
TSH SERPL DL<=0.05 MIU/L-ACNC: 2.93 UIU/ML (ref 0.36–3.74)
VIT B12 SERPL-MCNC: 683 PG/ML (ref 193–986)
VLDLC SERPL CALC-MCNC: 15.2 MG/DL
WBC # BLD AUTO: 3.7 K/UL (ref 3.6–11)

## 2021-11-26 NOTE — PROGRESS NOTES
Results reviewed and letter sent. The vitamin levels look good. The cholesterol remains elevated and a cholesterol lowering medication such as Lipitor, is still recommended, as discussed at your visit. Let me know if you would like me to send a prescription to your pharmacy. Meanwhile, be sure to follow a low fat diet. The A1C 5.7% shows that the blood sugar is just slightly elevated in the prediabetes range. I have included information below on how to use diet and lifestyle to lower your blood sugar. We should recheck this in 6 months. The remainder of the labs look good.

## 2021-12-01 ENCOUNTER — OFFICE VISIT (OUTPATIENT)
Dept: INTERNAL MEDICINE CLINIC | Age: 86
End: 2021-12-01
Payer: MEDICARE

## 2021-12-01 VITALS
HEART RATE: 72 BPM | HEIGHT: 65 IN | BODY MASS INDEX: 27.66 KG/M2 | RESPIRATION RATE: 18 BRPM | WEIGHT: 166 LBS | OXYGEN SATURATION: 93 % | SYSTOLIC BLOOD PRESSURE: 135 MMHG | DIASTOLIC BLOOD PRESSURE: 83 MMHG

## 2021-12-01 DIAGNOSIS — R30.0 DYSURIA: Primary | ICD-10-CM

## 2021-12-01 DIAGNOSIS — M54.50 ACUTE MIDLINE LOW BACK PAIN WITHOUT SCIATICA: ICD-10-CM

## 2021-12-01 DIAGNOSIS — M79.604 RIGHT LEG PAIN: ICD-10-CM

## 2021-12-01 PROCEDURE — 1090F PRES/ABSN URINE INCON ASSESS: CPT | Performed by: INTERNAL MEDICINE

## 2021-12-01 PROCEDURE — G8432 DEP SCR NOT DOC, RNG: HCPCS | Performed by: INTERNAL MEDICINE

## 2021-12-01 PROCEDURE — 99213 OFFICE O/P EST LOW 20 MIN: CPT | Performed by: INTERNAL MEDICINE

## 2021-12-01 PROCEDURE — 1101F PT FALLS ASSESS-DOCD LE1/YR: CPT | Performed by: INTERNAL MEDICINE

## 2021-12-01 PROCEDURE — G8427 DOCREV CUR MEDS BY ELIG CLIN: HCPCS | Performed by: INTERNAL MEDICINE

## 2021-12-01 PROCEDURE — G8536 NO DOC ELDER MAL SCRN: HCPCS | Performed by: INTERNAL MEDICINE

## 2021-12-01 PROCEDURE — G8419 CALC BMI OUT NRM PARAM NOF/U: HCPCS | Performed by: INTERNAL MEDICINE

## 2021-12-01 NOTE — PROGRESS NOTES
Dandre Godinez is a 80 y.o. female and presents with Urinary Pain  . Subjective:  Mrs. Juliano Spicer presents today with complaint of nocturia. She states over the past month or so she has had frequency of urination at nighttime. She has seen urology and has a history of a cystocele which is likely contributing factor. She states she was placed on medication for urinary frequency and she does not have this problem during the daytime. She has some low back pain but notes that she sustained a fall about a week ago. She has some mild discomfort in her lower back and her right leg. She is limping when she walks. She states the pain is significantly improved from that which she experienced initially. She is using Biofreeze topically for this with good results.     Past Medical History:   Diagnosis Date    Advance directive discussed with patient 06/22/2016    Allergic rhinitis     Bone spur 09/2013    Rt shoulder, Dr. Howard Wisdom impaction 12/20/15    [facility] notes    Chronic pain     left hip pain in the am    Cystocele 10/20/14    with nocturia [facility] notes rec'd     DDD (degenerative disc disease)     Elevated lipids     Essential hypertension     Hematuria 10/2008    renal cyst found on ultrasound but acute cystitis [facility]    Jaw fracture ([address] 11/2017    Laceration of foot 12/23/2016    flap type-sutures not done plain Td given    Left sided lacunar infarction ([address] 12/28/15    found on CT scan of head-old issue    Macular degeneration     VEI dr Jeri Bro    OM (otitis media), acute 6/4/15    [facility] and 10/14/15 ENT [facility]    Prolapse of female bladder, acquired 2012    Was treated with PT    Screening for glaucoma 02/27/2017 & 5/23/17    [place] [facility]    Swelling of limb, right     Thyroid nodule [id number]    colloid nodule dr Autumn Dixon    Tinnitus 1/6/16     [facility] Lieu -neck mass being monitored    Urinary urgency 2015          3/9/15    Physical Therapy thru [facility] notes rec'd    Vitamin D deficiency 10/2013     Past Surgical History:   Procedure Laterality Date    ECHOCARDIOGRAM  1/2004    mild LVH on echo    ENDOSCOPY, COLON, DIAGNOSTIC  2006    thru [facility]    HX CATARACT REMOVAL      left    HX CHOLECYSTECTOMY      HX GYN  2006    D and C    HX HEENT  4/2010    right cataract    HX OTHER SURGICAL  1/2009    foreign body granuloma dr Alix Meng Left 2/1/16    carotid dopplers done/old cva    HX UROLOGICAL  05/2015    pt states she has a prolapsed bladder but did not have any surgeries. she does kegel exercies     TX BREAST SURGERY PROCEDURE UNLISTED  1980    cyst right breast     Allergies   Allergen Reactions    Lisinopril-Hydrochlorothiazide Rash    Caffeine Other (comments)     insomnia     Current Outpatient Medications   Medication Sig Dispense Refill    losartan (COZAAR) 50 mg tablet Take 1 Tab by mouth daily. 90 Tab 3    estradioL (ESTRACE) 0.01 % (0.1 mg/gram) vaginal cream APPLY FINGERTIP SIZE AMOUNT TO VAGINA 3 TIMES/WEEK AT BEDTIME      trospium (SANCTURA XL) 60 mg capsule TAKE 1 CAPSULE BY MOUTH EVERY DAY  10    CHOLECALCIFEROL, VITAMIN D3, (VITAMIN D3 PO) Take 1 Tab by mouth daily.  peg 400-propylene glycol (SYSTANE) 0.4-0.3 % drop Administer 1 Drop to both eyes nightly.  fexofenadine (ALLEGRA) 180 mg tablet Take 180 mg by mouth daily.  benzonatate (TESSALON PERLES) 100 mg capsule Take 1 Cap by mouth three (3) times daily as needed for Cough.  (Patient not taking: Reported on 8/2/2021) 20 Cap 0     Social History     Socioeconomic History    Marital status: SINGLE   Tobacco Use    Smoking status: Never Smoker    Smokeless tobacco: Never Used   Vaping Use    Vaping Use: Never used   Substance and Sexual Activity    Alcohol use: Yes     Comment: social    Drug use: No    Sexual activity: Not Currently     Family History   Problem Relation Age of Onset    Cancer Sister         lung    Hypertension Sister     Stroke Brother     Heart Disease Brother    Martin [place] Sister         breast    Cancer Father         brain tumor       Review of Systems  Constitutional:  negative for fevers, chills, anorexia and weight loss  Eyes:    negative for visual disturbance and irritation  ENT:    negative for tinnitus,sore throat,nasal congestion,ear pains. hoarseness  Respiratory:     negative for cough, hemoptysis, dyspnea,wheezing  CV:    negative for chest pain, palpitations, lower extremity edema  GI:    negative for nausea, vomiting, diarrhea, abdominal pain,melena  Endo:               negative for polyuria,polydipsia,polyphagia,heat intolerance  Genitourinary : negative for frequency, dysuria and hematuria  Integumentary: negative for rash and pruritus  Hematologic:   negative for easy bruising and gum/nose bleeding  Musculoskel:  negative for myalgias, arthralgias, , muscle weakness, joint pain  Neurological:   negative for headaches, dizziness, vertigo, memory problems and gait   Behavl/Psych:  negative for feelings of anxiety, depression, mood changes  ROS otherwise negative      Objective:  Visit Vitals  /83 (BP 1 Location: Right arm, BP Patient Position: Sitting, BP Cuff Size: Large adult)   Pulse 72   Resp 18   Ht 5' 4.5\" (1.638 m)   Wt 166 lb (75.3 kg)   LMP  (LMP Unknown)   SpO2 93%   BMI 28.05 kg/m²     Physical Exam:   General appearance - alert, well appearing, and in no distress  Mental status - alert, oriented to person, place, and time  EYE-GARY, EOMI, fundi normal, corneas normal, no foreign bodies  ENT-ENT exam normal, no neck nodes or sinus tenderness  Nose - normal and patent, no erythema, discharge or polyps  Mouth - mucous membranes moist, pharynx normal without lesions  Neck - supple, no significant adenopathy   Chest - clear to auscultation, no wheezes, rales or rhonchi, symmetric air entry   Heart - normal rate, regular rhythm, normal S1, S2, no murmurs, rubs, clicks or gallops Abdomen - soft, nontender, nondistended, no masses or organomegaly  Lymph- no adenopathy palpable  Ext-peripheral pulses normal, no pedal edema, no clubbing or cyanosis  Skin-Warm and dry. no hyperpigmentation, vitiligo, or suspicious lesions  Neuro -alert, oriented, normal speech, no focal findings or movement disorder noted  Musculoskeletal-right hip does not demonstrate any point tenderness to direct palpation of the greater trochanteric bursa, flexion and internal rotation without discomfort or limitation, straight leg raising test negative, back demonstrates minimal tenderness with direct palpation, no muscle spasm is palpable    Assessment/Plan:  Diagnoses and all orders for this visit:    1. Dysuria  -     URINALYSIS W/ RFLX MICROSCOPIC; Future    2. Acute midline low back pain without sciatica    3. Right leg pain          ICD-10-CM ICD-9-CM    1. Dysuria  R30.0 788. 1 URINALYSIS W/ RFLX MICROSCOPIC   2. Acute midline low back pain without sciatica  M54.50 724.2    3. Right leg pain  M79.604 729.5        Plan:    The patient's urinary frequency is likely related to her history of having a cystocele. She remains on [place] per urology. She will have a urine analysis to rule out a UTI. Her back pain and leg pain are musculoskeletal in nature and should continue to improve. She does have a slight limp on exam but since she states she is improving I do not think further work-up is necessary at this time. Of course if her symptoms persist or progress she should follow-up for further evaluation. I have reviewed with the patient details of the assessment and plan and all questions were answered. Relevent patient education was performed. Verbal and/or written instructions (see AVS) provided. The most recent lab findings were reviewed with the patient. Plan was discussed with patient who verbally expressed understanding. An After Visit Summary was printed and given to the patient.     Sandro  Crista Steward MD

## 2021-12-01 NOTE — PROGRESS NOTES
1. Have you been to the ER, urgent care clinic since your last visit? Hospitalized since your last visit? No    2. Have you seen or consulted any other health care providers outside of the [address] [place] [address] since your last visit? Include any pap smears or colon screening.  No

## 2021-12-02 LAB
APPEARANCE UR: ABNORMAL
BACTERIA URNS QL MICRO: ABNORMAL /HPF
BILIRUB UR QL CFM: NEGATIVE
COLOR UR: ABNORMAL
COMMENT, HOLDF: NORMAL
EPITH CASTS URNS QL MICRO: ABNORMAL /LPF
GLUCOSE UR STRIP.AUTO-MCNC: NEGATIVE MG/DL
HGB UR QL STRIP: NEGATIVE
KETONES UR QL STRIP.AUTO: ABNORMAL MG/DL
LEUKOCYTE ESTERASE UR QL STRIP.AUTO: ABNORMAL
MUCOUS THREADS URNS QL MICRO: ABNORMAL /LPF
NITRITE UR QL STRIP.AUTO: NEGATIVE
PH UR STRIP: 5 [PH] (ref 5–8)
PROT UR STRIP-MCNC: NEGATIVE MG/DL
RBC #/AREA URNS HPF: ABNORMAL /HPF (ref 0–5)
SAMPLES BEING HELD,HOLD: NORMAL
SP GR UR REFRACTOMETRY: 1.03 (ref 1–1.03)
UROBILINOGEN UR QL STRIP.AUTO: 1 EU/DL (ref 0.2–1)
WBC URNS QL MICRO: ABNORMAL /HPF (ref 0–4)

## 2022-03-18 PROBLEM — H35.30 MACULAR DEGENERATION (SENILE) OF RETINA: Status: ACTIVE | Noted: 2018-02-13

## 2022-03-19 PROBLEM — H26.493 PCO (POSTERIOR CAPSULAR OPACIFICATION), BILATERAL: Status: ACTIVE | Noted: 2018-04-29

## 2022-03-19 PROBLEM — N81.10 FEMALE BLADDER PROLAPSE: Status: ACTIVE | Noted: 2017-06-26

## 2022-03-19 PROBLEM — B00.9 RECURRENT NONGENITAL HERPES SIMPLEX VIRUS (HSV) INFECTION: Status: ACTIVE | Noted: 2018-02-13

## 2022-10-26 DIAGNOSIS — I49.3 PVCS (PREMATURE VENTRICULAR CONTRACTIONS): ICD-10-CM

## 2022-10-26 DIAGNOSIS — I44.0 HEART BLOCK AV FIRST DEGREE: ICD-10-CM

## 2022-10-26 RX ORDER — LOSARTAN POTASSIUM 50 MG/1
50 TABLET ORAL DAILY
Qty: 7 TABLET | Refills: 0 | Status: SHIPPED | OUTPATIENT
Start: 2022-10-26

## 2022-10-26 NOTE — TELEPHONE ENCOUNTER
Pt daughter requested a refill on behalf of the pt for losartan 50mg, the daughter scheduled an appt for the pt to come in for prescription refills, pt has not been taking the medication due to her memory      Future Appointments   Date Time Provider Loretta Pedro   10/28/2022  9:00 AM MD JAIMEE Mejia BS [address] [facility] [phone]

## 2022-10-26 NOTE — TELEPHONE ENCOUNTER
Requested Prescriptions     Signed Prescriptions Disp Refills    losartan (COZAAR) 50 mg tablet 7 Tablet 0     Sig: Take 1 Tablet by mouth daily.      Authorizing Provider: Cory Eckert     Ordering User: Sonali Bhat    Per Dr. Silva [facility] verbal order     Future Appointments   Date Time Provider Loretta Pedro   10/28/2022  9:00 AM MD JAIMEE Moore AMB

## 2024-02-12 NOTE — PATIENT INSTRUCTIONS
